# Patient Record
Sex: FEMALE | Race: WHITE | NOT HISPANIC OR LATINO | Employment: OTHER | URBAN - METROPOLITAN AREA
[De-identification: names, ages, dates, MRNs, and addresses within clinical notes are randomized per-mention and may not be internally consistent; named-entity substitution may affect disease eponyms.]

---

## 2018-01-02 ENCOUNTER — CONVERSION ENCOUNTER (OUTPATIENT)
Dept: OTHER | Facility: HOSPITAL | Age: 68
End: 2018-01-02

## 2018-02-20 ENCOUNTER — OFFICE VISIT CONVERTED (OUTPATIENT)
Dept: FAMILY MEDICINE CLINIC | Age: 68
End: 2018-02-20
Attending: FAMILY MEDICINE

## 2018-03-14 VITALS
SYSTOLIC BLOOD PRESSURE: 95 MMHG | HEART RATE: 66 BPM | DIASTOLIC BLOOD PRESSURE: 67 MMHG | DIASTOLIC BLOOD PRESSURE: 66 MMHG | DIASTOLIC BLOOD PRESSURE: 68 MMHG | SYSTOLIC BLOOD PRESSURE: 132 MMHG | DIASTOLIC BLOOD PRESSURE: 64 MMHG | SYSTOLIC BLOOD PRESSURE: 105 MMHG | HEART RATE: 62 BPM | WEIGHT: 155 LBS | HEART RATE: 69 BPM | DIASTOLIC BLOOD PRESSURE: 63 MMHG | DIASTOLIC BLOOD PRESSURE: 46 MMHG | SYSTOLIC BLOOD PRESSURE: 94 MMHG | RESPIRATION RATE: 17 BRPM | OXYGEN SATURATION: 94 % | TEMPERATURE: 97.6 F | SYSTOLIC BLOOD PRESSURE: 104 MMHG | RESPIRATION RATE: 21 BRPM | RESPIRATION RATE: 19 BRPM | TEMPERATURE: 98.9 F | OXYGEN SATURATION: 95 % | SYSTOLIC BLOOD PRESSURE: 81 MMHG | HEART RATE: 79 BPM | DIASTOLIC BLOOD PRESSURE: 56 MMHG | SYSTOLIC BLOOD PRESSURE: 78 MMHG | DIASTOLIC BLOOD PRESSURE: 84 MMHG | HEIGHT: 69 IN | SYSTOLIC BLOOD PRESSURE: 89 MMHG | OXYGEN SATURATION: 97 % | HEART RATE: 63 BPM | RESPIRATION RATE: 18 BRPM | DIASTOLIC BLOOD PRESSURE: 59 MMHG | SYSTOLIC BLOOD PRESSURE: 90 MMHG | HEART RATE: 71 BPM | RESPIRATION RATE: 16 BRPM | HEART RATE: 65 BPM | SYSTOLIC BLOOD PRESSURE: 117 MMHG | HEART RATE: 70 BPM | RESPIRATION RATE: 15 BRPM | SYSTOLIC BLOOD PRESSURE: 96 MMHG | OXYGEN SATURATION: 98 %

## 2018-03-15 ENCOUNTER — OFFICE (AMBULATORY)
Dept: URBAN - METROPOLITAN AREA PATHOLOGY 4 | Facility: PATHOLOGY | Age: 68
End: 2018-03-15

## 2018-03-15 ENCOUNTER — AMBULATORY SURGICAL CENTER (AMBULATORY)
Dept: URBAN - METROPOLITAN AREA SURGERY 17 | Facility: SURGERY | Age: 68
End: 2018-03-15
Payer: COMMERCIAL

## 2018-03-15 DIAGNOSIS — D12.5 BENIGN NEOPLASM OF SIGMOID COLON: ICD-10-CM

## 2018-03-15 DIAGNOSIS — Z12.11 ENCOUNTER FOR SCREENING FOR MALIGNANT NEOPLASM OF COLON: ICD-10-CM

## 2018-03-15 DIAGNOSIS — K57.30 DIVERTICULOSIS OF LARGE INTESTINE WITHOUT PERFORATION OR ABS: ICD-10-CM

## 2018-03-15 DIAGNOSIS — K63.5 POLYP OF COLON: ICD-10-CM

## 2018-03-15 DIAGNOSIS — D12.0 BENIGN NEOPLASM OF CECUM: ICD-10-CM

## 2018-03-15 DIAGNOSIS — D12.2 BENIGN NEOPLASM OF ASCENDING COLON: ICD-10-CM

## 2018-03-15 DIAGNOSIS — K64.9 UNSPECIFIED HEMORRHOIDS: ICD-10-CM

## 2018-03-15 LAB
GI HISTOLOGY: A. UNSPECIFIED: (no result)
GI HISTOLOGY: B. UNSPECIFIED: (no result)
GI HISTOLOGY: C. UNSPECIFIED: (no result)
GI HISTOLOGY: PDF REPORT: (no result)

## 2018-03-15 PROCEDURE — 45380 COLONOSCOPY AND BIOPSY: CPT | Mod: 33,59 | Performed by: INTERNAL MEDICINE

## 2018-03-15 PROCEDURE — 88305 TISSUE EXAM BY PATHOLOGIST: CPT | Performed by: INTERNAL MEDICINE

## 2018-03-15 PROCEDURE — 45380 COLONOSCOPY AND BIOPSY: CPT | Mod: 59,33 | Performed by: INTERNAL MEDICINE

## 2018-03-15 PROCEDURE — 45385 COLONOSCOPY W/LESION REMOVAL: CPT | Mod: 33 | Performed by: INTERNAL MEDICINE

## 2018-03-15 RX ADMIN — PROPOFOL 150 MG: 10 INJECTION, EMULSION INTRAVENOUS at 12:47

## 2018-03-15 RX ADMIN — PROPOFOL 50 MG: 10 INJECTION, EMULSION INTRAVENOUS at 12:50

## 2018-03-15 RX ADMIN — PROPOFOL 25 MG: 10 INJECTION, EMULSION INTRAVENOUS at 13:05

## 2018-03-15 RX ADMIN — PROPOFOL 25 MG: 10 INJECTION, EMULSION INTRAVENOUS at 12:53

## 2018-03-15 RX ADMIN — PROPOFOL 50 MG: 10 INJECTION, EMULSION INTRAVENOUS at 13:00

## 2018-03-15 RX ADMIN — LIDOCAINE HYDROCHLORIDE 25 MG: 10 INJECTION, SOLUTION EPIDURAL; INFILTRATION; INTRACAUDAL; PERINEURAL at 12:47

## 2018-03-15 NOTE — SERVICEHPINOTES
Patient is a 66 yo F with h/o CN 5 years ago, cardiac ablation 2005, father with colitis, no fam hx colon ca, on no RX medications, but is on multiple OTC medications.  Discussed risks, benefits of the procedure with the patient and she wishes to proceed.  All questions answered.

## 2018-10-31 ENCOUNTER — OFFICE VISIT CONVERTED (OUTPATIENT)
Dept: FAMILY MEDICINE CLINIC | Age: 68
End: 2018-10-31
Attending: NURSE PRACTITIONER

## 2019-12-31 ENCOUNTER — HOSPITAL ENCOUNTER (OUTPATIENT)
Dept: OTHER | Facility: HOSPITAL | Age: 69
Discharge: HOME | End: 2019-12-31
Attending: FAMILY MEDICINE

## 2019-12-31 ENCOUNTER — OFFICE VISIT CONVERTED (OUTPATIENT)
Dept: FAMILY MEDICINE CLINIC | Age: 69
End: 2019-12-31
Attending: FAMILY MEDICINE

## 2019-12-31 LAB
ALBUMIN SERPL-MCNC: 4.5 G/DL (ref 3.5–5)
ALBUMIN/GLOB SERPL: 1.6 {RATIO} (ref 1.4–2.6)
ALP SERPL-CCNC: 53 U/L (ref 43–160)
ALT SERPL-CCNC: 14 U/L (ref 10–40)
ANION GAP SERPL CALC-SCNC: 16 MMOL/L (ref 8–19)
AST SERPL-CCNC: 19 U/L (ref 15–50)
BILIRUB SERPL-MCNC: 0.27 MG/DL (ref 0.2–1.3)
BUN SERPL-MCNC: 14 MG/DL (ref 5–25)
BUN/CREAT SERPL: 16 {RATIO} (ref 6–20)
CALCIUM SERPL-MCNC: 9.5 MG/DL (ref 8.7–10.4)
CHLORIDE SERPL-SCNC: 98 MMOL/L (ref 99–111)
CHOLEST SERPL-MCNC: 211 MG/DL (ref 107–200)
CHOLEST/HDLC SERPL: 3.1 {RATIO} (ref 3–6)
CONV CO2: 26 MMOL/L (ref 22–32)
CONV TOTAL PROTEIN: 7.3 G/DL (ref 6.3–8.2)
CREAT UR-MCNC: 0.85 MG/DL (ref 0.5–0.9)
ERYTHROCYTE [DISTWIDTH] IN BLOOD BY AUTOMATED COUNT: 13.2 % (ref 11.5–14.5)
GFR SERPLBLD BASED ON 1.73 SQ M-ARVRAT: >60 ML/MIN/{1.73_M2}
GLOBULIN UR ELPH-MCNC: 2.8 G/DL (ref 2–3.5)
GLUCOSE SERPL-MCNC: 87 MG/DL (ref 65–99)
HBA1C MFR BLD: 11.8 G/DL (ref 12–16)
HCT VFR BLD AUTO: 36.6 % (ref 37–47)
HDLC SERPL-MCNC: 67 MG/DL (ref 40–60)
LDLC SERPL CALC-MCNC: 126 MG/DL (ref 70–100)
MCH RBC QN AUTO: 28.6 PG (ref 27–31)
MCHC RBC AUTO-ENTMCNC: 32.2 G/DL (ref 33–37)
MCV RBC AUTO: 88.6 FL (ref 81–99)
OSMOLALITY SERPL CALC.SUM OF ELEC: 282 MOSM/KG (ref 273–304)
PLATELET # BLD AUTO: 243 10*3/UL (ref 130–400)
PMV BLD AUTO: 8.9 FL (ref 7.4–10.4)
POTASSIUM SERPL-SCNC: 4 MMOL/L (ref 3.5–5.3)
RBC # BLD AUTO: 4.13 10*6/UL (ref 4.2–5.4)
SODIUM SERPL-SCNC: 136 MMOL/L (ref 135–147)
TRIGL SERPL-MCNC: 88 MG/DL (ref 40–150)
VLDLC SERPL-MCNC: 18 MG/DL (ref 5–37)
WBC # BLD AUTO: 5.45 10*3/UL (ref 4.8–10.8)

## 2020-01-02 LAB
IRON SATN MFR SERPL: 16 % (ref 20–55)
IRON SERPL-MCNC: 57 UG/DL (ref 60–170)
TIBC SERPL-MCNC: 360 UG/DL (ref 245–450)
TRANSFERRIN SERPL-MCNC: 252 MG/DL (ref 250–380)

## 2020-01-13 ENCOUNTER — OFFICE VISIT CONVERTED (OUTPATIENT)
Dept: FAMILY MEDICINE CLINIC | Age: 70
End: 2020-01-13
Attending: FAMILY MEDICINE

## 2020-01-15 ENCOUNTER — HOSPITAL ENCOUNTER (OUTPATIENT)
Dept: OTHER | Facility: HOSPITAL | Age: 70
Discharge: HOME | End: 2020-01-15
Attending: FAMILY MEDICINE

## 2020-03-05 ENCOUNTER — OFFICE (AMBULATORY)
Dept: URBAN - METROPOLITAN AREA CLINIC 75 | Facility: CLINIC | Age: 70
End: 2020-03-05

## 2020-03-05 VITALS
DIASTOLIC BLOOD PRESSURE: 88 MMHG | RESPIRATION RATE: 16 BRPM | SYSTOLIC BLOOD PRESSURE: 126 MMHG | HEIGHT: 69 IN | WEIGHT: 167 LBS

## 2020-03-05 DIAGNOSIS — K64.9 UNSPECIFIED HEMORRHOIDS: ICD-10-CM

## 2020-03-05 DIAGNOSIS — R07.89 OTHER CHEST PAIN: ICD-10-CM

## 2020-03-05 DIAGNOSIS — K57.30 DIVERTICULOSIS OF LARGE INTESTINE WITHOUT PERFORATION OR ABS: ICD-10-CM

## 2020-03-05 DIAGNOSIS — Z86.010 PERSONAL HISTORY OF COLONIC POLYPS: ICD-10-CM

## 2020-03-05 PROCEDURE — 99214 OFFICE O/P EST MOD 30 MIN: CPT | Performed by: NURSE PRACTITIONER

## 2020-03-10 VITALS
DIASTOLIC BLOOD PRESSURE: 79 MMHG | DIASTOLIC BLOOD PRESSURE: 80 MMHG | RESPIRATION RATE: 20 BRPM | RESPIRATION RATE: 18 BRPM | OXYGEN SATURATION: 97 % | TEMPERATURE: 98.2 F | HEART RATE: 70 BPM | TEMPERATURE: 97.8 F | SYSTOLIC BLOOD PRESSURE: 136 MMHG | OXYGEN SATURATION: 95 % | HEIGHT: 69 IN | SYSTOLIC BLOOD PRESSURE: 123 MMHG | SYSTOLIC BLOOD PRESSURE: 110 MMHG | HEART RATE: 64 BPM | OXYGEN SATURATION: 98 % | RESPIRATION RATE: 17 BRPM | SYSTOLIC BLOOD PRESSURE: 140 MMHG | WEIGHT: 155 LBS | SYSTOLIC BLOOD PRESSURE: 106 MMHG | HEART RATE: 74 BPM | OXYGEN SATURATION: 96 % | HEART RATE: 60 BPM | SYSTOLIC BLOOD PRESSURE: 111 MMHG | HEART RATE: 69 BPM | DIASTOLIC BLOOD PRESSURE: 82 MMHG | RESPIRATION RATE: 13 BRPM | DIASTOLIC BLOOD PRESSURE: 70 MMHG | DIASTOLIC BLOOD PRESSURE: 63 MMHG | HEART RATE: 73 BPM | DIASTOLIC BLOOD PRESSURE: 71 MMHG | RESPIRATION RATE: 12 BRPM

## 2020-03-12 ENCOUNTER — OFFICE (AMBULATORY)
Dept: URBAN - METROPOLITAN AREA PATHOLOGY 4 | Facility: PATHOLOGY | Age: 70
End: 2020-03-12

## 2020-03-12 ENCOUNTER — AMBULATORY SURGICAL CENTER (AMBULATORY)
Dept: URBAN - METROPOLITAN AREA SURGERY 17 | Facility: SURGERY | Age: 70
End: 2020-03-12

## 2020-03-12 DIAGNOSIS — K29.70 GASTRITIS, UNSPECIFIED, WITHOUT BLEEDING: ICD-10-CM

## 2020-03-12 DIAGNOSIS — R10.13 EPIGASTRIC PAIN: ICD-10-CM

## 2020-03-12 DIAGNOSIS — K29.60 OTHER GASTRITIS WITHOUT BLEEDING: ICD-10-CM

## 2020-03-12 DIAGNOSIS — R14.2 ERUCTATION: ICD-10-CM

## 2020-03-12 DIAGNOSIS — K21.0 GASTRO-ESOPHAGEAL REFLUX DISEASE WITH ESOPHAGITIS: ICD-10-CM

## 2020-03-12 DIAGNOSIS — K22.70 BARRETT'S ESOPHAGUS WITHOUT DYSPLASIA: ICD-10-CM

## 2020-03-12 LAB
GI HISTOLOGY: A. SELECT: (no result)
GI HISTOLOGY: B. UNSPECIFIED: (no result)
GI HISTOLOGY: PDF REPORT: (no result)

## 2020-03-12 PROCEDURE — 43239 EGD BIOPSY SINGLE/MULTIPLE: CPT | Performed by: INTERNAL MEDICINE

## 2020-03-12 PROCEDURE — 88305 TISSUE EXAM BY PATHOLOGIST: CPT | Performed by: INTERNAL MEDICINE

## 2020-11-13 ENCOUNTER — OFFICE VISIT CONVERTED (OUTPATIENT)
Dept: FAMILY MEDICINE CLINIC | Age: 70
End: 2020-11-13
Attending: FAMILY MEDICINE

## 2020-11-16 ENCOUNTER — HOSPITAL ENCOUNTER (OUTPATIENT)
Dept: OTHER | Facility: HOSPITAL | Age: 70
Discharge: HOME | End: 2020-11-16
Attending: FAMILY MEDICINE

## 2020-11-16 LAB
ALBUMIN SERPL-MCNC: 4.5 G/DL (ref 3.5–5)
ALBUMIN/GLOB SERPL: 1.6 {RATIO} (ref 1.4–2.6)
ALP SERPL-CCNC: 61 U/L (ref 43–160)
ALT SERPL-CCNC: 12 U/L (ref 10–40)
ANION GAP SERPL CALC-SCNC: 15 MMOL/L (ref 8–19)
AST SERPL-CCNC: 17 U/L (ref 15–50)
BILIRUB SERPL-MCNC: 0.35 MG/DL (ref 0.2–1.3)
BUN SERPL-MCNC: 11 MG/DL (ref 5–25)
BUN/CREAT SERPL: 14 {RATIO} (ref 6–20)
CALCIUM SERPL-MCNC: 10 MG/DL (ref 8.7–10.4)
CHLORIDE SERPL-SCNC: 101 MMOL/L (ref 99–111)
CONV CO2: 27 MMOL/L (ref 22–32)
CONV TOTAL PROTEIN: 7.4 G/DL (ref 6.3–8.2)
CREAT UR-MCNC: 0.81 MG/DL (ref 0.5–0.9)
ERYTHROCYTE [DISTWIDTH] IN BLOOD BY AUTOMATED COUNT: 13.6 % (ref 11.5–14.5)
GFR SERPLBLD BASED ON 1.73 SQ M-ARVRAT: >60 ML/MIN/{1.73_M2}
GLOBULIN UR ELPH-MCNC: 2.9 G/DL (ref 2–3.5)
GLUCOSE SERPL-MCNC: 84 MG/DL (ref 65–99)
HBA1C MFR BLD: 11.9 G/DL (ref 12–16)
HCT VFR BLD AUTO: 35.5 % (ref 37–47)
MCH RBC QN AUTO: 29.2 PG (ref 27–31)
MCHC RBC AUTO-ENTMCNC: 33.5 G/DL (ref 33–37)
MCV RBC AUTO: 87.2 FL (ref 81–99)
OSMOLALITY SERPL CALC.SUM OF ELEC: 287 MOSM/KG (ref 273–304)
PLATELET # BLD AUTO: 255 10*3/UL (ref 130–400)
PMV BLD AUTO: 8.7 FL (ref 7.4–10.4)
POTASSIUM SERPL-SCNC: 4.2 MMOL/L (ref 3.5–5.3)
RBC # BLD AUTO: 4.07 10*6/UL (ref 4.2–5.4)
SODIUM SERPL-SCNC: 139 MMOL/L (ref 135–147)
WBC # BLD AUTO: 6.22 10*3/UL (ref 4.8–10.8)

## 2020-11-17 LAB
FERRITIN SERPL-MCNC: 85 NG/ML (ref 10–200)
FOLATE SERPL-MCNC: 14.2 NG/ML (ref 4.8–20)
IRON SATN MFR SERPL: 13 % (ref 20–55)
IRON SERPL-MCNC: 46 UG/DL (ref 60–170)
TIBC SERPL-MCNC: 349 UG/DL (ref 245–450)
TRANSFERRIN SERPL-MCNC: 244 MG/DL (ref 250–380)

## 2021-04-22 VITALS
DIASTOLIC BLOOD PRESSURE: 49 MMHG | DIASTOLIC BLOOD PRESSURE: 61 MMHG | HEART RATE: 75 BPM | SYSTOLIC BLOOD PRESSURE: 89 MMHG | HEIGHT: 69 IN | HEART RATE: 81 BPM | HEART RATE: 83 BPM | SYSTOLIC BLOOD PRESSURE: 79 MMHG | DIASTOLIC BLOOD PRESSURE: 42 MMHG | SYSTOLIC BLOOD PRESSURE: 129 MMHG | SYSTOLIC BLOOD PRESSURE: 117 MMHG | DIASTOLIC BLOOD PRESSURE: 41 MMHG | HEART RATE: 76 BPM | SYSTOLIC BLOOD PRESSURE: 112 MMHG | RESPIRATION RATE: 28 BRPM | HEART RATE: 74 BPM | SYSTOLIC BLOOD PRESSURE: 84 MMHG | RESPIRATION RATE: 22 BRPM | DIASTOLIC BLOOD PRESSURE: 45 MMHG | SYSTOLIC BLOOD PRESSURE: 152 MMHG | RESPIRATION RATE: 24 BRPM | RESPIRATION RATE: 20 BRPM | WEIGHT: 155 LBS | RESPIRATION RATE: 17 BRPM | HEART RATE: 77 BPM | SYSTOLIC BLOOD PRESSURE: 142 MMHG | SYSTOLIC BLOOD PRESSURE: 88 MMHG | DIASTOLIC BLOOD PRESSURE: 78 MMHG | HEART RATE: 87 BPM | SYSTOLIC BLOOD PRESSURE: 92 MMHG | DIASTOLIC BLOOD PRESSURE: 43 MMHG | SYSTOLIC BLOOD PRESSURE: 82 MMHG | RESPIRATION RATE: 12 BRPM | RESPIRATION RATE: 19 BRPM | OXYGEN SATURATION: 97 % | DIASTOLIC BLOOD PRESSURE: 71 MMHG | SYSTOLIC BLOOD PRESSURE: 86 MMHG | OXYGEN SATURATION: 98 % | RESPIRATION RATE: 16 BRPM | TEMPERATURE: 98.2 F | DIASTOLIC BLOOD PRESSURE: 48 MMHG | RESPIRATION RATE: 7 BRPM | DIASTOLIC BLOOD PRESSURE: 90 MMHG | OXYGEN SATURATION: 99 % | RESPIRATION RATE: 18 BRPM | OXYGEN SATURATION: 100 % | RESPIRATION RATE: 21 BRPM | HEART RATE: 88 BPM | TEMPERATURE: 97 F

## 2021-04-27 ENCOUNTER — OFFICE VISIT CONVERTED (OUTPATIENT)
Dept: FAMILY MEDICINE CLINIC | Age: 71
End: 2021-04-27
Attending: NURSE PRACTITIONER

## 2021-04-28 ENCOUNTER — HOSPITAL ENCOUNTER (OUTPATIENT)
Dept: OTHER | Facility: HOSPITAL | Age: 71
Discharge: HOME | End: 2021-04-28
Attending: NURSE PRACTITIONER

## 2021-04-28 LAB
ALBUMIN SERPL-MCNC: 4.2 G/DL (ref 3.5–5)
ALBUMIN/GLOB SERPL: 1.6 {RATIO} (ref 1.4–2.6)
ALP SERPL-CCNC: 44 U/L (ref 43–160)
ALT SERPL-CCNC: 10 U/L (ref 10–40)
ANION GAP SERPL CALC-SCNC: 10 MMOL/L (ref 8–19)
AST SERPL-CCNC: 18 U/L (ref 15–50)
BASOPHILS # BLD MANUAL: 0.03 10*3/UL (ref 0–0.2)
BASOPHILS NFR BLD MANUAL: 0.9 % (ref 0–3)
BILIRUB SERPL-MCNC: 0.28 MG/DL (ref 0.2–1.3)
BUN SERPL-MCNC: 15 MG/DL (ref 5–25)
BUN/CREAT SERPL: 17 {RATIO} (ref 6–20)
CALCIUM SERPL-MCNC: 9.2 MG/DL (ref 8.7–10.4)
CHLORIDE SERPL-SCNC: 103 MMOL/L (ref 99–111)
CHOLEST SERPL-MCNC: 200 MG/DL (ref 107–200)
CHOLEST/HDLC SERPL: 2.5 {RATIO} (ref 3–6)
CONV CO2: 27 MMOL/L (ref 22–32)
CONV TOTAL PROTEIN: 6.8 G/DL (ref 6.3–8.2)
CREAT UR-MCNC: 0.9 MG/DL (ref 0.5–0.9)
DEPRECATED RDW RBC AUTO: 43.7 FL
EOSINOPHIL # BLD MANUAL: 0.1 10*3/UL (ref 0–0.7)
EOSINOPHIL NFR BLD MANUAL: 3 % (ref 0–7)
ERYTHROCYTE [DISTWIDTH] IN BLOOD BY AUTOMATED COUNT: 13.4 % (ref 11.5–14.5)
GFR SERPLBLD BASED ON 1.73 SQ M-ARVRAT: >60 ML/MIN/{1.73_M2}
GLOBULIN UR ELPH-MCNC: 2.6 G/DL (ref 2–3.5)
GLUCOSE SERPL-MCNC: 79 MG/DL (ref 65–99)
GRANS (ABSOLUTE): 1.62 10*3/UL (ref 2–8)
GRANS: 48.5 % (ref 30–85)
HBA1C MFR BLD: 10.4 G/DL (ref 12–16)
HCT VFR BLD AUTO: 32.1 % (ref 37–47)
HDLC SERPL-MCNC: 81 MG/DL (ref 40–60)
IMM GRANULOCYTES # BLD: 0 10*3/UL (ref 0–0.54)
IMM GRANULOCYTES NFR BLD: 0 % (ref 0–0.43)
LDLC SERPL CALC-MCNC: 111 MG/DL (ref 70–100)
LYMPHOCYTES # BLD MANUAL: 1.33 10*3/UL (ref 1–5)
LYMPHOCYTES NFR BLD MANUAL: 7.8 % (ref 3–10)
MCH RBC QN AUTO: 28.6 PG (ref 27–31)
MCHC RBC AUTO-ENTMCNC: 32.4 G/DL (ref 33–37)
MCV RBC AUTO: 88.2 FL (ref 81–99)
MONOCYTES # BLD AUTO: 0.26 10*3/UL (ref 0.2–1.2)
OSMOLALITY SERPL CALC.SUM OF ELEC: 282 MOSM/KG (ref 273–304)
PLATELET # BLD AUTO: 249 10*3/UL (ref 130–400)
PMV BLD AUTO: 9.7 FL (ref 7.4–10.4)
POTASSIUM SERPL-SCNC: 4.2 MMOL/L (ref 3.5–5.3)
RBC # BLD AUTO: 3.64 10*6/UL (ref 4.2–5.4)
SODIUM SERPL-SCNC: 136 MMOL/L (ref 135–147)
TRIGL SERPL-MCNC: 40 MG/DL (ref 40–150)
URATE SERPL-MCNC: 2.7 MG/DL (ref 2.5–7.5)
VARIANT LYMPHS NFR BLD MANUAL: 39.8 % (ref 20–45)
VLDLC SERPL-MCNC: 8 MG/DL (ref 5–37)
WBC # BLD AUTO: 3.34 10*3/UL (ref 4.8–10.8)

## 2021-04-29 ENCOUNTER — OFFICE (AMBULATORY)
Dept: URBAN - METROPOLITAN AREA PATHOLOGY 4 | Facility: PATHOLOGY | Age: 71
End: 2021-04-29
Payer: COMMERCIAL

## 2021-04-29 ENCOUNTER — AMBULATORY SURGICAL CENTER (AMBULATORY)
Dept: URBAN - METROPOLITAN AREA SURGERY 17 | Facility: SURGERY | Age: 71
End: 2021-04-29
Payer: COMMERCIAL

## 2021-04-29 DIAGNOSIS — Z86.010 PERSONAL HISTORY OF COLONIC POLYPS: ICD-10-CM

## 2021-04-29 DIAGNOSIS — K57.30 DIVERTICULOSIS OF LARGE INTESTINE WITHOUT PERFORATION OR ABS: ICD-10-CM

## 2021-04-29 DIAGNOSIS — K62.1 RECTAL POLYP: ICD-10-CM

## 2021-04-29 DIAGNOSIS — D12.0 BENIGN NEOPLASM OF CECUM: ICD-10-CM

## 2021-04-29 LAB
GI HISTOLOGY: A. UNSPECIFIED: (no result)
GI HISTOLOGY: B. UNSPECIFIED: (no result)
GI HISTOLOGY: PDF REPORT: (no result)
IRON SATN MFR SERPL: 13 % (ref 20–55)
IRON SERPL-MCNC: 45 UG/DL (ref 60–170)
TIBC SERPL-MCNC: 335 UG/DL (ref 245–450)
TRANSFERRIN SERPL-MCNC: 234 MG/DL (ref 250–380)

## 2021-04-29 PROCEDURE — 45380 COLONOSCOPY AND BIOPSY: CPT | Mod: 33,59 | Performed by: INTERNAL MEDICINE

## 2021-04-29 PROCEDURE — 45385 COLONOSCOPY W/LESION REMOVAL: CPT | Mod: 33 | Performed by: INTERNAL MEDICINE

## 2021-04-29 PROCEDURE — 88305 TISSUE EXAM BY PATHOLOGIST: CPT | Mod: 33 | Performed by: INTERNAL MEDICINE

## 2021-04-29 NOTE — SERVICEHPINOTES
ALIREZA MEANS  is a  70  female   who presents today for a  Colonoscopy   for   the indications listed below. The updated Patient Profile was reviewed prior to the procedure, in conjunction with the Physical Exam, including medical conditions, surgical procedures, medications, allergies, family history and social history. See Physical Exam time stamp below for date and time of HPI completion.Pre-operatively, I reviewed the indication(s) for the procedure, the risks of the procedure [including but not limited to: unexpected bleeding possibly requiring hospitalization and/or unplanned repeat procedures, perforation possibly requiring surgical treatment, missed lesions and complications of sedation/MAC (also explained by anesthesia staff)]. I have evaluated the patient for risks associated with the planned anesthesia and the procedure to be performed and find the patient an acceptable candidate for IV sedation.Multiple opportunities were provided for any questions or concerns, and all questions were answered satisfactorily before any anesthesia was administered. We will proceed with the planned procedure.BR

## 2021-05-18 NOTE — PROGRESS NOTES
Hortencia Parisi 1950     Office/Outpatient Visit    Visit Date: Wed, Oct 31, 2018 09:26 am    Provider: Emily Padilla N.P. (Assistant: Sofie Anderson MA)    Location: [facility]        Electronically signed by Emily Padilla N.P. on  10/31/2018 05:16:50 PM                             SUBJECTIVE:        CC:     Ms. Parisi is a 68 year old White female.  She presents with Pt fell on Sunday, Left ankle swollen & painful.  (DISCUSS PNEUMONIA VACCINES)         HPI:         Patient to be evaluated for ankle pain.  The pain is primarily in the left ankle.  The level of pain between 1 and 10 is a 2.  Swelling is noted to be mild.  The pain does not radiate.  It began 3 days ago.  The precipitating event appears to have been twisted ankle walking down basement stairs at home..  Other details: using crutches, wearing compression sock and applying ice.  has not felt need for tylenol..      ROS:     CONSTITUTIONAL:  Negative for chills, fatigue, fever, and weight change.      CARDIOVASCULAR:  Negative for chest pain, palpitations, tachycardia, orthopnea, and edema.      RESPIRATORY:  Negative for cough, dyspnea, and hemoptysis.      MUSCULOSKELETAL:  Positive for left ankle pain.      NEUROLOGICAL:  Negative for dizziness, headaches, paresthesias, and weakness.          PM/FM/SH:     Last Reviewed on 10/31/2018 09:58 AM by Emily Padilla    Past Medical History:             PREVENTIVE HEALTH MAINTENANCE             BREAST EXAM: with normal results     BONE DENSITY: has never been done     COLORECTAL CANCER SCREENING: Up to date (colonoscopy q10y; sigmoidoscopy q5y; Cologuard q3y) was last done march 2018, Results are in chart; colonoscopy with the following abnormalities noted-- diverticulitis, Polyp(s), and adenomatous-  follow up 2021     MAMMOGRAM: Done within last 2 years and results in are chart was last done jan 2018 with normal results     PAP SMEAR: was last done dec 2017 with  normal results         Surgical History:     Procedures: colonoscopy 07         Family History:     Father:  at age 79;  Aortic Aneurysm; Arrhythmia ( A-fib );  Renal Carcinoma;  COPD     Mother:  at age 75;  Diverticulitis -  of ruptured bowel;  osteoprosis     Brother(s): Healthy; 1 brother(s) total     Sister(s): Healthy; 1 sister(s) total         Tobacco/Alcohol/Supplements:     Last Reviewed on 10/31/2018 09:32 AM by Sofie Anderson    Tobacco: She has never smoked.          Substance Abuse History:     Last Reviewed on 2017 12:23 PM by Jarrett Marrero        Mental Health History:     Last Reviewed on 2017 12:23 PM by Jarrett Marrero        Communicable Diseases (eg STDs):     Last Reviewed on 2017 12:23 PM by Jarrett Marrero            Current Problems:     Last Reviewed on 2017 12:23 PM by Jarrett Marrero    Irritated seborrheic keratosis     Hemangioma, other sites     Seborrheic keratosis, other     Actinic keratosis     Sinus headaches         Immunizations:     zzPrevnar-13 2016     PNEUMOVAX 23 (Pneumococcal PPV23) 2017     Adacel (Tdap) 11/3/2017         Allergies:     Last Reviewed on 2018 11:36 AM by Alma Poon    Prednisone:        Current Medications:     Last Reviewed on 10/31/2018 09:30 AM by Sofie Anderson    Dimenhydrinate 50mg Tablet Take 1 tablet(s) by mouth q 4 to 6 hr prn     Calcium 600 Tablet 1 tab daily     Multivitamins Tablet 1 tab daily     Loratadine 10mg Tablet 1 tab daily     flonase     Juice plus q day         OBJECTIVE:        Vitals:         Historical:     2018  BP:   117/77 mm Hg ( (left arm, , sitting, );)     2018  Wt:   156.8lbs        Current: 10/31/2018 9:33:26 AM    Ht:  5 ft, 8 in;  Wt: 156 lbs (Estimated);  BMI: 23.7    T: 97.4 F (oral);  BP: 122/81 mm Hg (left arm, sitting);  P: 76 bpm (left arm (BP Cuff), sitting)        Exams:     PHYSICAL EXAM:     GENERAL:  well  developed and nourished; appropriately groomed; in no apparent distress;     RESPIRATORY: normal respiratory rate and pattern with no distress; normal breath sounds with no rales, rhonchi, wheezes or rubs;     CARDIOVASCULAR: normal rate; rhythm is regular;     Peripheral Pulses: dorsalis pedis: 2+ L;  posterior tibial: 2+ L;  mild swelling left lateral ankle edema;     MUSCULOSKELETAL: decreased range of motion noted in: left ankle flexion and extension;  pain with range of motion in: left ankle flexion, extension, and inversion;     NEUROLOGIC: mental status: alert and oriented x 3; GROSSLY INTACT     PSYCHIATRIC:  appropriate affect and demeanor; normal speech pattern; grossly normal memory;         ASSESSMENT           719.47   M25.572  Ankle pain              DDx:         ORDERS:         Radiology/Test Orders:       [id number]  Radiologic examination, left ankle; complete minimum 3 views  (Send-Out)                   PLAN:          Ankle pain         RADIOLOGY:  I have ordered a left ankle xray to be done today.      RECOMMENDATIONS given include: RICE therapy and xray pending..            Orders:       [id number]  Radiologic examination, left ankle; complete minimum 3 views  (Send-Out)               Patient Recommendations:        For  Ankle pain:     left ankle x-ray Rest the foot and keep it elevated as much as possible. Apply ice over the affected area. Use a compression ankle wrap, such as an Ace bandage.              CHARGE CAPTURE           **Please note: ICD descriptions below are intended for billing purposes only and may not represent clinical diagnoses**        Primary Diagnosis:         719.47 Ankle pain            M25.572    Pain in left ankle and joints of left foot              Orders:          [id number]   Office/outpatient visit; established patient, level 3  (In-House)

## 2021-05-18 NOTE — PROGRESS NOTES
Hortencia Parisi  1950     Office/Outpatient Visit    Visit Date: Mon, Jan 13, 2020 10:11 am    Provider: Scott Adair MD (Assistant: Iris John MA)    Location: [facility]        Electronically signed by Scott Adair MD on  01/13/2020 11:29:22 PM                             Subjective:        CC: chest pain    HPI:       Kandice is in today for follow up on chest pain.  She says that this has been present all weekend.  It started Saturday after aerobics.  It let up Saturday evening - meaning that it went away completely.  She says that she had symptoms yesterday and then again today.  She says that this reminds her of what she experiences when she had pneumonia.  She has noted that her heart rate has been elevated as well on Saturday, but that was after aerobics.  She did have some shortness of breath with aerobics on Saturday.  Her heart rate was 158 at that time.  She says that this is not pain.  She says that there is the feeling of pressure.  She is not willing to quantify this as pain.  She has never smoked.  She does not take cholesterol or blood pressure medication.  She does take iron for some anemia.    ROS:     CONSTITUTIONAL:  Negative for chills and fever.      CARDIOVASCULAR:  Negative for chest pain and palpitations.      RESPIRATORY:  Negative for recent cough.      GASTROINTESTINAL:  Negative for abdominal pain, nausea and vomiting.      INTEGUMENTARY/BREAST:  Negative for atypical mole(s) and rash.          Past Medical History / Family History / Social History:         Last Reviewed on 1/13/2020 10:23 AM by Scott Adair    Past Medical History:             PREVENTIVE HEALTH MAINTENANCE             BREAST EXAM: with normal results     BONE DENSITY: has never been done     COLORECTAL CANCER SCREENING: Up to date (colonoscopy q10y; sigmoidoscopy q5y; Cologuard q3y) was last done march 2018, Results are in chart; colonoscopy with the following  abnormalities noted-- diverticulitis, Polyp(s), and adenomatous-  follow up      MAMMOGRAM: Done within last 2 years and results in are chart was last done 2018 with normal results     PAP SMEAR: was last done dec 2017 with normal results         Surgical History:     Procedures: colonoscopy 07         Family History:     Father:  at age 79;  Aortic Aneurysm; Arrhythmia ( A-fib );  Renal Carcinoma;  COPD     Mother:  at age 75;  Diverticulitis -  of ruptured bowel;  osteoprosis     Brother(s): Healthy; 1 brother(s) total     Sister(s): Healthy; 1 sister(s) total         Tobacco/Alcohol/Supplements:     Last Reviewed on 2020 10:23 AM by Scott Adair    Tobacco: She has never smoked.          Substance Abuse History:     Last Reviewed on 2020 10:23 AM by Scott Adair        Mental Health History:     Last Reviewed on 2020 10:23 AM by Scott Adair        Communicable Diseases (eg STDs):     Last Reviewed on 2020 10:23 AM by Scott Adair        Current Problems:     Last Reviewed on 2020 10:23 AM by Scott Adair    Actinic keratosis    Other seborrheic keratosis    Hemangioma of other sites    Inflamed seborrheic keratosis    Headache    Encounter for screening for cardiovascular disorders    Other chest pain    Anemia, unspecified        Immunizations:     zzPrevnar-13 2016    PNEUMOVAX 23 (Pneumococcal PPV23) 2017    Adacel (Tdap) 11/3/2017        Allergies:     Last Reviewed on 2020 10:23 AM by Scott Adair    Prednisone:          Current Medications:     Last Reviewed on 2020 10:23 AM by Scott Adair    Loratadine 10 mg oral tablet [1 tab daily]    Dimenhydrinate 50 mg oral tablet [Take 1 tablet(s) by mouth q 4 to 6 hr prn]    Calcium 600 600 mg calcium (1,500 mg) oral tablet [1 tab daily]    multivitamin oral tablet [1 tab daily]    Juice plus q day     flonase     ferrous  sulfate 325 mg (65 mg iron) oral tablet [take 1 tablet (325 mg) by oral route once daily, take with Orange Juice or Vit C tablet to improve absorption]    ZyrTEC 10 mg oral tablet [take 1 tablet (10 mg) by oral route once daily]        Objective:        Vitals:         Current: 1/13/2020 10:16:04 AM    Ht:  5 ft, 8 in;  Wt: 161 lbs;  BMI: 24.5T: 97.9 F (oral);  BP: 120/72 mm Hg (right arm, sitting);  P: 68 bpm (right arm (BP Cuff), sitting);  sCr: 0.85 mg/dL;  GFR: 68.90        Exams:     PHYSICAL EXAM:     GENERAL: vital signs recorded - well developed, well nourished;  no apparent distress;     NECK: range of motion is normal; thyroid is non-palpable;     RESPIRATORY: normal respiratory rate and pattern with no distress; normal breath sounds with no rales, rhonchi, wheezes or rubs;     CARDIOVASCULAR: normal rate; rhythm is regular;  no systolic murmur; no edema;     GASTROINTESTINAL: nontender; normal bowel sounds; no organomegaly;     LYMPHATIC: no enlargement of cervical or facial nodes; no supraclavicular nodes;     BREAST/INTEGUMENT: SKIN: no significant rashes or lesions; no suspicious moles;     PSYCHIATRIC: affect/demeanor: anxious;  normal psychomotor function;         Lab/Test Results:         LABORATORY RESULTS: EKG performed by tls         Assessment:         R07.89   Other chest pain           ORDERS:         Radiology/Test Orders:       [id number]  Electrocardiogram, routine with at least 12 leads; with interpretation and report  (In-House)            3017F  Colorectal CA screen results documented and reviewed (PV)  (In-House)              Other Orders:         Depression screen negative  (In-House)                      Plan:         Other chest pain        TESTS/PROCEDURES:  Will proceed with an ECG to be performed/scheduled now.      RECOMMENDATIONS given include: Today, we have reviewed her care.  Her EKG is completely normal.  After talking with her further, we will have her go to the ER as a  precaution for initial testing.  She at least needs cardiac enzymes, X-ray, and possibly D-dimer testing done.  It is possible there is some GI or lung issue causing her symptoms, but the description of them developing after exercise concerns me.  I will call the ER and make them aware that she is coming..  MIPS PHQ-9 Depression Screening: Completed form scanned and in chart; Total Score 0; Negative Depression Screen           Orders:       [id number]  Electrocardiogram, routine with at least 12 leads; with interpretation and report  (In-House)              Depression screen negative  (In-House)            3017F  Colorectal CA screen results documented and reviewed (PV)  (In-House)                  Charge Capture:         Primary Diagnosis:     R07.89  Other chest pain           Orders:      [id number]  Office/outpatient visit; established patient, level 4  (In-House)            [id number]  Electrocardiogram, routine with at least 12 leads; with interpretation and report  (In-House)              Depression screen negative  (In-House)            3017F  Colorectal CA screen results documented and reviewed (PV)  (In-House)

## 2021-05-18 NOTE — PROGRESS NOTES
Hortencia Parisi  1950     Office/Outpatient Visit    Visit Date: Tue, Apr 27, 2021 02:40 pm    Provider: Fabiola Alcantar N.P. (Assistant: Jessica Zavala MA)    Location: [facility]        Electronically signed by Fabiola Alcantar N.P. on  04/27/2021 03:55:21 PM                             Subjective:        CC: Mrs. Parisi is a 70 year old White female.  Patient presents today with complaints of R hand swelling and pain X 4 days;         HPI: 71 y/o female presenting to office c/o right hand edema and pain x 4 days. No known injury. She has tried at home for symptoms. She went to Urgent care earlier today and xray was negative. She had been working outside in the garden prior to pain onset.  Erythema and edema worsening.        Pt is also requesting routine blood work and preventative imaging.  Her last mammogram was in 2018. She has not had DEXA scan in the past. Pap no longer indicated. Colonoscopy scheduled for this thursday. Pt declines flu and shingles vaccine. Pt has no other acute complaints at this time.    ROS:     CONSTITUTIONAL:  Negative for fatigue, fever and night sweats.      EYES:  Negative for blurred vision.      E/N/T:  Negative for diminished hearing.      CARDIOVASCULAR:  Negative for chest pain.      RESPIRATORY:  Negative for dyspnea.      GASTROINTESTINAL:  Negative for abdominal pain, diarrhea, nausea and vomiting.      GENITOURINARY:  Negative for dysuria.      MUSCULOSKELETAL:  See HPI     INTEGUMENTARY/BREAST:  Negative for rash.      NEUROLOGICAL:  Negative for dizziness, paresthesias and weakness.      PSYCHIATRIC:  Negative for anxiety, depression and suicidal thoughts.          Past Medical History / Family History / Social History:         Last Reviewed on 4/27/2021 03:43 PM by Fabiola Alcantar    Past Medical History:             PREVENTIVE HEALTH MAINTENANCE             BREAST EXAM: with normal results     BONE DENSITY: has never been done      COLORECTAL CANCER SCREENING: Up to date (colonoscopy q10y; sigmoidoscopy q5y; Cologuard q3y) was last done 2018, Results are in chart; colonoscopy with the following abnormalities noted-- diverticulitis, Polyp(s), and adenomatous-  follow up  scheduled 21     MAMMOGRAM: Done within last 2 years and results in are chart was last done 2018 with normal results     PAP SMEAR: was last done dec 2017 with normal results             PAST MEDICAL HISTORY         Villarreal's Esophagus         Surgical History:     Procedures: colonoscopy 07         Family History:     Father:  at age 79;  Aortic Aneurysm; Arrhythmia ( A-fib );  Renal Carcinoma;  COPD     Mother:  at age 75;  Diverticulitis -  of ruptured bowel;  osteoprosis     Brother(s): Healthy; 1 brother(s) total     Sister(s): Healthy; 1 sister(s) total         Tobacco/Alcohol/Supplements:     Last Reviewed on 2021 03:43 PM by Fabiola Alcantar    Tobacco: She has never smoked.          Substance Abuse History:     Last Reviewed on 2021 03:43 PM by Fabiola Alcantar    None         Mental Health History:     Last Reviewed on 2021 03:43 PM by Fabiola Alcantar    NEGATIVE         Communicable Diseases (eg STDs):     Last Reviewed on 2021 03:43 PM by Fabiola Alcantar        Immunizations:     zzPrevnar-13 2016    PNEUMOVAX 23 (Pneumococcal PPV23) 2017    Adacel (Tdap) 11/3/2017        Allergies:     Last Reviewed on 2021 03:43 PM by Fabiola Alcantar    Prednisone:          Current Medications:     Last Reviewed on 2021 03:43 PM by Fabiola Alcantar    ZyrTEC 10 mg oral tablet [take 1 tablet (10 mg) by oral route once daily]    Dimenhydrinate 50 mg oral tablet [Take 1 tablet(s) by mouth q 4 to 6 hr prn]    Calcium 600 600 mg calcium (1,500 mg) oral tablet [1 tab daily]    flonase     Protonix 40 mg oral tablet, delayed release (enteric coated) [take 1 tablet (40 mg) by oral route once daily]     Singulair 10 mg oral tablet [Take 1 tablet(s) by mouth each evening]        Objective:        Vitals:         Current: 4/27/2021 2:44:22 PM    Ht:  5 ft, 8 in;  Wt: 157.4 lbs;  BMI: 23.9T: 97.9 F (temporal);  BP: 121/66 mm Hg (right arm, sitting);  P: 87 bpm (right arm (BP Cuff), sitting);  sCr: 0.81 mg/dL;  GFR: 70.67        Exams:     PHYSICAL EXAM:     GENERAL: vital signs recorded - well developed, well nourished;  well groomed;  no apparent distress;     EYES: extraocular movements intact; conjunctiva and cornea are normal; PERRLA;     NECK: range of motion is normal; thyroid is non-palpable;     RESPIRATORY: normal respiratory rate and pattern with no distress; normal breath sounds with no rales, rhonchi, wheezes or rubs;     CARDIOVASCULAR: normal rate; rhythm is regular;  no systolic murmur; no edema;     GASTROINTESTINAL: nontender, nondistended; no hepatosplenomegaly or masses; no bruits;     BREAST/INTEGUMENT: no rash/jaundice;     MUSCULOSKELETAL: normal gait; normal overall tone erythema, edema and warmth noted to right superior hand.;     NEUROLOGIC: mental status: alert and oriented x 3;     PSYCHIATRIC:  appropriate affect and demeanor; normal speech pattern; grossly normal memory;         Assessment:         M79.641   Pain in right hand       Z00.01   Encounter for general adult medical examination with abnormal findings           ORDERS:         Meds Prescribed:       [Refilled] cephALEXin 500 mg oral capsule [take 1 capsule (500 mg) by oral route 3 times per day], #30 (thirty) capsules, Refills: 0 (zero)         Radiology/Test Orders:       [id number]  DXA, bone density study, 1 or more sites; axial skeleton (eg hips, pelvis, spine)  (Send-Out)            [id number]  Screening mammography bi 2-view inc CAD  (Send-Out)              Lab Orders:       [id number]  BDCBC - [facility] CBC with 3 part diff  (Send-Out)            [id number]  COMP - [facility] Comp. Metabolic Panel  (Send-Out)            51001  LPDP - [facility] Lipid Panel   (Send-Out)            05248  URIC [facility] Uric Acid, Serum  (Send-Out)                      Plan:         Pain in right handAppears to be cellulitis. less likely, gout. Since getting routine labs, will r/o. luke warm espom salt soaks in case there is a thorn that can be pulled out. Otherwise use ice therapy - 20 minutes at a time - 4 x daily. Complete rx abx. Monitor for worsening signs of infection. Pt v/u and had no further questions upon d/c.     LABORATORY:  Labs ordered to be performed today include uric acid.            Prescriptions:       [Refilled] cephALEXin 500 mg oral capsule [take 1 capsule (500 mg) by oral route 3 times per day], #30 (thirty) capsules, Refills: 0 (zero)           Orders:       [id number]  URIC - [facility] Uric Acid, Serum  (Send-Out)              Encounter for general adult medical examination with abnormal findingsDEXA and mammo request sent to referrals. Will notify pt of results. Pt declined flu and shingles vaccine, but is otherwise UTD with preventative care.     LABORATORY:  Labs ordered to be performed today include CBC, Comprehensive metabolic panel, and lipid panel.      RADIOLOGY:  I have ordered Dexa Scan and Mammogram Screening to be done today.            Orders:       [id number]  [facility] CBC with 3 part diff  (Send-Out)            40896  COMP [facility] Comp. Metabolic Panel  (Send-Out)            98039  DP [facility] Lipid Panel  (Send-Out)            34128  DXA, bone density study, 1 or more sites; axial skeleton (eg hips, pelvis, spine)  (Send-Out)            04317  Screening mammography bi 2-view inc CAD  (Send-Out)                  Charge Capture:         Primary Diagnosis:     M79.641  Pain in right hand           Orders:      [id number]  Office/outpatient visit; established patient, level 3  (In-House)              Z00.01  Encounter for general adult medical examination with abnormal findings           Orders:      [id number]  Preventive medicine, established patient, age 65+ years  (In-House)

## 2021-05-18 NOTE — PROGRESS NOTES
Hortencia Parisi 1950     Office/Outpatient Visit    Visit Date: Tue, Feb 20, 2018 11:34 am    Provider: Jarrett Marrero MD (Assistant: Alma Poon MA)    Location: [facility]        Electronically signed by Jarrett Marrero MD on  02/25/2018 12:01:23 PM                             SUBJECTIVE:        CC:     Ms. Parisi is a 67 year old White female.  This is a follow-up visit.  mole removal, med refill;         HPI:     She is  asking for refill of her dramamine which she takes for motion sickness, but also takes occasionally if she get neck spasm and seems to be quite  helpful.     She has several skin tags on her neck that rub up against necklaces and collars so cause her discomfort/irritation.     She also has a skin lesion at the bra line that rubs and gets irritated.  Exam shows SK     ROS:     CONSTITUTIONAL:  Negative for chills, fatigue, fever, and weight change.      EYES:  Negative for blurred vision.      CARDIOVASCULAR:  Negative for chest pain, orthopnea, paroxysmal nocturnal dyspnea and pedal edema.      RESPIRATORY:  Negative for dyspnea.      GASTROINTESTINAL:  Negative for abdominal pain, constipation, diarrhea, nausea and vomiting.      GENITOURINARY:  Negative for dysuria and frequent urination.      NEUROLOGICAL:  Negative for dizziness, headaches, paresthesias, and weakness.      PSYCHIATRIC:  Negative for anxiety, depression, and sleep disturbances.          PMH/FMH/SH:     Last Reviewed on 11/03/2017 12:23 PM by Jarrett Marrero    Past Medical History:             PREVENTIVE HEALTH MAINTENANCE             BREAST EXAM: with normal results     BONE DENSITY: has never been done     COLORECTAL CANCER SCREENING:; colonoscopy with the following abnormalities noted-- diverticulitis     MAMMOGRAM: with normal results     PAP SMEAR: was last done dec 2017 with normal results         Surgical History:     Procedures: colonoscopy 6/1/07         Family History:      Father:  at age 79;  Aortic Aneurysm; Arrhythmia ( A-fib );  Renal Carcinoma;  COPD     Mother:  at age 75;  Diverticulitis -  of ruptured bowel;  osteoprosis     Brother(s): Healthy; 1 brother(s) total     Sister(s): Healthy; 1 sister(s) total         Tobacco/Alcohol/Supplements:     Last Reviewed on 2017 11:31 AM by Sofie Anderson    Tobacco: She has never smoked.          Substance Abuse History:     Last Reviewed on 2017 12:23 PM by Jarrett Marrero        Mental Health History:     Last Reviewed on 2017 12:23 PM by Jarrett Marrero        Communicable Diseases (eg STDs):     Last Reviewed on 2017 12:23 PM by Jarrett Marrero            Allergies:     Last Reviewed on 2017 11:31 AM by Sofie Anderson    Prednisone:        Current Medications:     Last Reviewed on 2017 11:33 AM by Sofie Anderson    Dimenhydrinate 50mg Tablet Take 1 tablet(s) by mouth q 4 to 6 hr prn     Fluticasone Propionate 50mcg/1actuation Nasal Spray 1 spray in each nostril daily  prn     Calcium 600 Tablet 1 tab daily     Multivitamins Tablet 1 tab daily     Loratadine 10mg Tablet 1 tab daily         OBJECTIVE:        Vitals:         Current: 2018 11:35:55 AM    Ht:  5 ft, 8 in;  Wt: 156.8 lbs;  BMI: 23.8    T: 98.1 F (oral);  BP: 117/77 mm Hg (left arm, sitting);  P: 70 bpm (left arm (BP Cuff), sitting)        Exams:     PHYSICAL EXAM:     GENERAL: well developed, well nourished;  well groomed;  no apparent distress;     EYES: nonicteric;     NECK:  supple, full ROM; no thyromegaly; no carotid bruits;     RESPIRATORY: normal appearance and symmetric expansion of chest wall; normal respiratory rate and pattern with no distress; normal breath sounds with no rales, rhonchi, wheezes or rubs;     CARDIOVASCULAR: normal rate; rhythm is regular;  no systolic murmur;     BREAST/INTEGUMENT: several skin tags on her neck at the collar line.; most are on the left side of neck.  Also has 1.5 cm SK on back left of center at the bra line.     MUSCULOSKELETAL: no pedal edema;     NEUROLOGIC: no focal lateralizing deficits.;     PSYCHIATRIC:  appropriate affect and demeanor; normal speech pattern; grossly normal memory;         Procedures:     Skin tag         Procedure Note:     Informed consent obtained in writing.  She expresses understanding that a scar may remain after the lesion is removed.  Sterile technique is observed.  Multiple skin tags are located around the neck.   Anesthesia was obtained with 2.5 cc of 1% lidocaine with epinephrine.   The method of removal is shave excision.   Hemostasis is achieved with silver nitrate.          Irritated seborrheic keratosis         Procedure Note:         Benign appearing lesion #1 is a seborrheic keratosis located on back at bra line.   The method of lesion destruction is cryotherapy destruction.  Freeze-thaw-freeze             ASSESSMENT           994.6   T75.3XXA  Motion sickness              DDx:     701.9   L91.8  Skin tag              DDx:     702.11   L82.0  Irritated seborrheic keratosis              DDx:     782.0   R20.8  Disturbance of skin sensation              DDx:         ORDERS:         Meds Prescribed:       Refill of: Dimenhydrinate (Dimenhydrinate)  50mg Tablet Take 1 tablet(s) by mouth q 4 to 6 hr prn  #30 (Thirty) tablet(s) Refills: 2         Procedures Ordered:       80671-22  Removal of skin tags, multiple fibrocutaneous tags, any area; (initial 1-15)  (In-House)         34868  Destruction, benign or premalignant lesions; first lesion  (In-House)                   PLAN:          Motion sickness           Prescriptions:       Refill of: Dimenhydrinate (Dimenhydrinate)  50mg Tablet Take 1 tablet(s) by mouth q 4 to 6 hr prn  #30 (Thirty) tablet(s) Refills: 2          Skin tag           Orders:       80079-02  Removal of skin tags, multiple fibrocutaneous tags, any area; (initial 1-15)  (In-House)            Irritated  seborrheic keratosis           Orders:       02002  Destruction, benign or premalignant lesions; first lesion  (In-House)               CHARGE CAPTURE           **Please note: ICD descriptions below are intended for billing purposes only and may not represent clinical diagnoses**        Primary Diagnosis:         994.6 Motion sickness            T75.3XXA    Motion sickness, initial encounter              Orders:          77582 -25  Office/outpatient visit; established patient, level 2  (In-House)           701.9 Skin tag            L91.8    Other hypertrophic disorders of the skin              Orders:          62434 -51  Removal of skin tags, multiple fibrocutaneous tags, any area; (initial 1-15)  (In-House)           702.11 Irritated seborrheic keratosis            L82.0    Inflamed seborrheic keratosis              Orders:          23572   Destruction, benign or premalignant lesions; first lesion  (In-House)           782.0 Disturbance of skin sensation            R20.8    Other disturbances of skin sensation

## 2021-05-18 NOTE — PROGRESS NOTES
Hortencia Parisi  1950     Office/Outpatient Visit    Visit Date: Fri, Nov 13, 2020 08:51 am    Provider: Scott Adair MD (Assistant: Piedad Khalil LPN)    Location: [facility]        Electronically signed by Scott Adair MD on  11/14/2020 08:02:19 AM                             Subjective:        CC: Villarreal's esophagus        TELEMEDICINE VISIT:    - Kandice consented to this telemedicine visit.    - Persons present during the telemedicine consultation include:  Kandice - patient, Dr. Adair    - This visit is being conducted over FaceTime with audio and video.    HPI:       Kandice is being seen today for follow up on GERD and Villarreal's esophagus.  She is currently taking Protonix for this and tolerates it well.  She denies acute issue in this regard.  She will be planning to have EGD on a regular basis.        Kandice also has history of sinus pressure and pain that continues to bother her.  She has previously taken Zyrtec and loratadine, but she still having frequent headache that she believes is sinus related.  She denies fever or chills.  She denies congestion or drainage.  It is really the headache that bothers her.          As noted above, she has noted this ongoing headache.  It does seem frontal in nature.  She is wondering as above if there could be a sinus infection.  The headache has been more prevalent over the last 6 weeks.    ROS:     CONSTITUTIONAL:  Negative for chills and fever.      CARDIOVASCULAR:  Negative for chest pain and palpitations.      RESPIRATORY:  Negative for recent cough and dyspnea.      GASTROINTESTINAL:  Negative for abdominal pain, nausea and vomiting.      INTEGUMENTARY/BREAST:  Negative for atypical mole(s) and rash.          Past Medical History / Family History / Social History:         Last Reviewed on 11/13/2020 09:18 AM by Scott Adair    Past Medical History:             PREVENTIVE HEALTH MAINTENANCE             BREAST EXAM: with  normal results     BONE DENSITY: has never been done     COLORECTAL CANCER SCREENING: Up to date (colonoscopy q10y; sigmoidoscopy q5y; Cologuard q3y) was last done 2018, Results are in chart; colonoscopy with the following abnormalities noted-- diverticulitis, Polyp(s), and adenomatous-  follow up      MAMMOGRAM: Done within last 2 years and results in are chart was last done 2018 with normal results     PAP SMEAR: was last done dec 2017 with normal results             PAST MEDICAL HISTORY         Villarreal's Esophagus         Surgical History:     Procedures: colonoscopy 07         Family History:     Father:  at age 79;  Aortic Aneurysm; Arrhythmia ( A-fib );  Renal Carcinoma;  COPD     Mother:  at age 75;  Diverticulitis -  of ruptured bowel;  osteoprosis     Brother(s): Healthy; 1 brother(s) total     Sister(s): Healthy; 1 sister(s) total         Tobacco/Alcohol/Supplements:     Last Reviewed on 2020 09:18 AM by Scott Adair    Tobacco: She has never smoked.          Substance Abuse History:     Last Reviewed on 2020 09:18 AM by Scott Adair        Mental Health History:     Last Reviewed on 2020 09:18 AM by Scott Adair        Communicable Diseases (eg STDs):     Last Reviewed on 2020 09:18 AM by Scott Adair        Current Problems:     Last Reviewed on 2020 09:18 AM by Scott Adair    Actinic keratosis    Hemangioma of other sites    Other seborrheic keratosis    Inflamed seborrheic keratosis    Anemia, unspecified    Headache    Encounter for screening for cardiovascular disorders    Other chest pain    Dyspnea, unspecified    Allergic rhinitis, unspecified    Villarreal's esophagus without dysplasia    Headache, unspecified        Immunizations:     zzPrevnar-13 2016    PNEUMOVAX 23 (Pneumococcal PPV23) 2017    Adacel (Tdap) 11/3/2017        Allergies:     Last Reviewed on 2020 09:18 AM by  Scott Adair    Prednisone:          Current Medications:     Last Reviewed on 11/13/2020 09:18 AM by Scott Adair    ZyrTEC 10 mg oral tablet [take 1 tablet (10 mg) by oral route once daily]    Dimenhydrinate 50 mg oral tablet [Take 1 tablet(s) by mouth q 4 to 6 hr prn]    Calcium 600 600 mg calcium (1,500 mg) oral tablet [1 tab daily]    flonase     Protonix 40 mg oral tablet, delayed release (enteric coated) [take 1 tablet (40 mg) by oral route once daily]        Objective:        Exams:     PHYSICAL EXAM:     GENERAL: well developed, well nourished;  no apparent distress;     EYES: extraocular movements intact; conjunctiva and cornea are normal;     RESPIRATORY: normal respiratory rate and pattern with no distress;     LYMPHATIC: no enlargement of cervical or facial nodes; no supraclavicular nodes;     BREAST/INTEGUMENT: SKIN: no significant rashes or lesions; no suspicious moles;     NEUROLOGIC: mental status: alert and oriented x 3; cranial nerves II-XII grossly intact;     PSYCHIATRIC: appropriate affect and demeanor; normal psychomotor function;         Assessment:         K22.70   Villarreal's esophagus without dysplasia       J30.9   Allergic rhinitis, unspecified       R51.9   Headache, unspecified       D12.6   Benign neoplasm of colon, unspecified           ORDERS:         Meds Prescribed:       [Refilled] Protonix 40 mg oral tablet, delayed release (enteric coated) [take 1 tablet (40 mg) by oral route once daily], #90 (ninety) tablets, Refills: 3 (three)       [New Rx] Singulair 10 mg oral tablet [Take 1 tablet(s) by mouth each evening], #30 (thirty) tablets, Refills: 0 (zero)         Radiology/Test Orders:       [id number]  Radiologic examination, sinuses, paranasal, complete, minimum of three views  (Send-Out)              Lab Orders:       [id number]  BDCB2 - [facility] CBC w/o diff  (Send-Out)            24575  COMP - [facility] Comp. Metabolic Panel  (Send-Out)                      Plan:          Villarreal's esophagus without dysplasia     LABORATORY:  Labs ordered to be performed today include CBC W/O DIFF and Comprehensive metabolic panel.      RECOMMENDATIONS given include: Today, we have reviewed Kandice's care.  She is well.  I'm going to refill her medications as noted.  She should stay on something like Protonix for life given the findings on EGD.  Additionally, we will add coverage for her allergies as noted.  If the headache does not seem to improve, then consider sinus X-rays as below.  Overall, she remains in good health..            Prescriptions:       [Refilled] Protonix 40 mg oral tablet, delayed release (enteric coated) [take 1 tablet (40 mg) by oral route once daily], #90 (ninety) tablets, Refills: 3 (three)       [New Rx] Singulair 10 mg oral tablet [Take 1 tablet(s) by mouth each evening], #30 (thirty) tablets, Refills: 0 (zero)           Orders:       [id number]  [facility] CBC w/o diff  (Send-Out)            36933  COMP - [facility] Comp. Metabolic Panel  (Send-Out)              Allergic rhinitis, unspecifiedAs above.        Headache, unspecified        RADIOLOGY:  I have ordered Sinus series xray to be done today.            Orders:       [id number]  Radiologic examination, sinuses, paranasal, complete, minimum of three views  (Send-Out)              Benign neoplasm of colon, unspecifiedWe will contact her in [facility] for repeat exam as per Dr. Wesley.            Patient Recommendations:        For  Headache, unspecified:    I also recommend Sinus series xray.              Charge Capture:         Primary Diagnosis:     K22.70  Villarreal's esophagus without dysplasia           Orders:      33272  Office/outpatient visit; established patient, level 4  (In-House)              J30.9  Allergic rhinitis, unspecified     R51.9  Headache, unspecified     D12.6  Benign neoplasm of colon, unspecified

## 2021-05-18 NOTE — PROGRESS NOTES
Hortencia Parisi  1950     Office/Outpatient Visit    Visit Date: Tue, Dec 31, 2019 11:05 am    Provider: Jarrett Marrero MD (Assistant: Kannan Wheeler, )    Location: [facility]        Electronically signed by Jarrett Marrero MD on  01/05/2020 04:18:17 PM                             Subjective:        CC: Mrs. Parisi is a 69 year old White female.  presents today due to CARMICHAEL This is a follow-up visit.          HPI:       Hortencia is here reporting CARMICHAEL for the past few weeks.  She has been taking flonase and claritin regularly.  HA is around both eyes. Does not feel congested in sinus.  Sometimes nose runs other time dry.She says her  also has been reporting HA.  There are no combustible sources for CO as far as she knows. He of allergies which have resulted in some sinus issues in the past.  Has been using over-the-counter Flonase and Claritin for quite some time.      She does have a prior hx of anemia, and even though she doesn't not look anemic, we did discuss the possibility of how anemia could be associated with HA.      ROS:     CONSTITUTIONAL:  Negative for chills, fatigue, fever, and weight change.      EYES:  Negative for blurred vision.      CARDIOVASCULAR:  Negative for chest pain, orthopnea, paroxysmal nocturnal dyspnea and pedal edema.      RESPIRATORY:  Negative for dyspnea.      GASTROINTESTINAL:  Negative for abdominal pain, constipation, diarrhea, nausea and vomiting.      GENITOURINARY:  Negative for dysuria and frequent urination.      NEUROLOGICAL:  Positive for headaches.      PSYCHIATRIC:  Negative for anxiety, depression, and sleep disturbances.          Past Medical History / Family History / Social History:         Last Reviewed on 12/31/2019 12:12 PM by Jarrett Marrero    Past Medical History:             PREVENTIVE HEALTH MAINTENANCE             BREAST EXAM: with normal results     BONE DENSITY: has never been done     COLORECTAL CANCER  SCREENING: Up to date (colonoscopy q10y; sigmoidoscopy q5y; Cologuard q3y) was last done 2018, Results are in chart; colonoscopy with the following abnormalities noted-- diverticulitis, Polyp(s), and adenomatous-  follow up      MAMMOGRAM: Done within last 2 years and results in are chart was last done 2018 with normal results     PAP SMEAR: was last done dec 2017 with normal results         Surgical History:     Procedures: colonoscopy 07         Family History:     Father:  at age 79;  Aortic Aneurysm; Arrhythmia ( A-fib );  Renal Carcinoma;  COPD     Mother:  at age 75;  Diverticulitis -  of ruptured bowel;  osteoprosis     Brother(s): Healthy; 1 brother(s) total     Sister(s): Healthy; 1 sister(s) total         Tobacco/Alcohol/Supplements:     Last Reviewed on 2019 11:08 AM by Kannan Wheeler    Tobacco: She has never smoked.          Substance Abuse History:     Last Reviewed on 2017 12:23 PM by Jarrett Marrero        Mental Health History:     Last Reviewed on 2017 12:23 PM by Jarrett Marrero        Communicable Diseases (eg STDs):     Last Reviewed on 2017 12:23 PM by Jarrett Marrero        Allergies:     Last Reviewed on 2019 11:08 AM by Kannan Wheeler    Prednisone:          Current Medications:     Last Reviewed on 2019 11:08 AM by Kannan Wheeler    Loratadine 10mg Tablet [1 tab daily]    Dimenhydrinate 50mg Tablet [Take 1 tablet(s) by mouth q 4 to 6 hr prn]    Calcium 600 1.5gm Tablet [1 tab daily]    Multivitamins  Tablet [1 tab daily]    Juice plus q day    flonase        Objective:        Vitals:         Current: 2019 11:10:20 AM    Ht:  5 ft, 8 in;  Wt: 160.4 lbs;  BMI: 24.4T: 98.5 F (oral);  BP: 104/65 mm Hg (left arm, sitting);  P: 95 bpm (left arm (BP Cuff), sitting)        Exams:     PHYSICAL EXAM:     GENERAL: well developed, well nourished;  well groomed;  no apparent distress;     EYES: nonicteric;      E/N/T: EARS:  normal external auditory canals and tympanic membranes;  grossly normal hearing; NOSE: no sinus tenderness; OROPHARYNX:  normal mucosa, dentition, gingiva, and posterior pharynx;     NECK:  supple, full ROM; no thyromegaly; no carotid bruits;     RESPIRATORY: normal appearance and symmetric expansion of chest wall; normal respiratory rate and pattern with no distress; normal breath sounds with no rales, rhonchi, wheezes or rubs;     CARDIOVASCULAR: normal rate; rhythm is regular;  no systolic murmur;     GASTROINTESTINAL: nontender, nondistended; no hepatosplenomegaly or masses; no bruits;     GENITOURINARY: no CVAT;     LYMPHATIC: no enlargement of cervical or facial nodes; no supraclavicular nodes;     MUSCULOSKELETAL: no pedal edema;     NEUROLOGIC: no focal lateralizing deficits.;     PSYCHIATRIC:  appropriate affect and demeanor; normal speech pattern; grossly normal memory;         Assessment:         R51   Headache       D64.9   Anemia, unspecified       Z13.6   Encounter for screening for cardiovascular disorders           ORDERS:         Lab Orders:       [id number]  HTN - [facility] CMP AND LIPID: [id number], [id number]  (Send-Out)            05553  BDCB2 - [facility] CBC w/o diff  (Send-Out)                      Plan:         HeadacheCould be allergic so suggest she try Allegra instead of Claritin, and maybe switch her nasal steroid too.  Can also try adding on singular.  If symptoms don't improve we may want to get imaging of sinuses.     LABORATORY:  Labs ordered to be performed today include HTN/Lipid Panel: CMP, Lipid.            Orders:       [id number]  HTN - [facility] CMP AND LIPID: [id number], [id number]  (Send-Out)              Anemia, unspecified    LABORATORY:  Labs ordered to be performed today include CBC W/O DIFF.            Orders:       [id number]  BDCB2 - [facility] CBC w/o diff  (Send-Out)                  Other Patient Education Handouts:     Dragon transcription disclaimer:        Much of this encounter note is an electronic  transcription/translation of spoken language to printed text.  The electronic translation of spoken language may permit erroneous, or at times, nonsensical words or phrases to be inadvertently transcribed.  Although I have reviewed the note for such errors, some may still exist.        Charge Capture:         Primary Diagnosis:     R51  Headache           Orders:      55594  Office/outpatient visit; established patient, level 4  (In-House)              D64.9  Anemia, unspecified     Z13.6  Encounter for screening for cardiovascular disorders

## 2021-06-09 ENCOUNTER — TRANSCRIBE ORDERS (OUTPATIENT)
Dept: ADMINISTRATIVE | Facility: HOSPITAL | Age: 71
End: 2021-06-09

## 2021-06-09 DIAGNOSIS — Z00.01 ENCOUNTER FOR GENERAL ADULT MEDICAL EXAMINATION WITH ABNORMAL FINDINGS: Primary | ICD-10-CM

## 2021-06-16 RX ORDER — PANTOPRAZOLE SODIUM 40 MG/1
TABLET, DELAYED RELEASE ORAL
COMMUNITY
Start: 2021-06-16 | End: 2021-09-16 | Stop reason: SDUPTHER

## 2021-06-16 RX ORDER — MONTELUKAST SODIUM 10 MG/1
TABLET ORAL
Qty: 90 TABLET | Refills: 0 | Status: SHIPPED | OUTPATIENT
Start: 2021-06-16 | End: 2023-02-16

## 2021-07-01 VITALS
BODY MASS INDEX: 23.76 KG/M2 | TEMPERATURE: 98.1 F | SYSTOLIC BLOOD PRESSURE: 117 MMHG | WEIGHT: 156.8 LBS | HEIGHT: 68 IN | DIASTOLIC BLOOD PRESSURE: 77 MMHG | HEART RATE: 70 BPM

## 2021-07-01 VITALS
HEART RATE: 76 BPM | TEMPERATURE: 97.4 F | HEIGHT: 68 IN | BODY MASS INDEX: 23.64 KG/M2 | DIASTOLIC BLOOD PRESSURE: 81 MMHG | WEIGHT: 156 LBS | SYSTOLIC BLOOD PRESSURE: 122 MMHG

## 2021-07-02 VITALS
TEMPERATURE: 97.9 F | HEART RATE: 68 BPM | HEIGHT: 68 IN | DIASTOLIC BLOOD PRESSURE: 72 MMHG | WEIGHT: 161 LBS | SYSTOLIC BLOOD PRESSURE: 120 MMHG | BODY MASS INDEX: 24.4 KG/M2

## 2021-07-02 VITALS
DIASTOLIC BLOOD PRESSURE: 65 MMHG | SYSTOLIC BLOOD PRESSURE: 104 MMHG | HEIGHT: 68 IN | WEIGHT: 160.4 LBS | TEMPERATURE: 98.5 F | BODY MASS INDEX: 24.31 KG/M2 | HEART RATE: 95 BPM

## 2021-07-02 VITALS
SYSTOLIC BLOOD PRESSURE: 121 MMHG | TEMPERATURE: 97.9 F | HEIGHT: 68 IN | DIASTOLIC BLOOD PRESSURE: 66 MMHG | HEART RATE: 87 BPM | BODY MASS INDEX: 23.86 KG/M2 | WEIGHT: 157.4 LBS

## 2021-09-16 RX ORDER — PANTOPRAZOLE SODIUM 40 MG/1
40 TABLET, DELAYED RELEASE ORAL DAILY
Qty: 90 TABLET | Refills: 0 | Status: SHIPPED | OUTPATIENT
Start: 2021-09-16 | End: 2021-12-16 | Stop reason: SDUPTHER

## 2021-09-16 NOTE — TELEPHONE ENCOUNTER
Caller: Hortencia Pairsi    Relationship: Self    Best call back number: [phone]    Medication needed:   Requested Prescriptions     Pending Prescriptions Disp Refills   • pantoprazole (PROTONIX) 40 MG EC tablet         When do you need the refill by: ASAP    What additional details did the patient provide when requesting the medication:   PATIENT STATED THAT THE PRESCRIPTION WAS CALLED INTO [facility] AND THEY HAVE CLOSED DOWN THEIR DRIVE THRU WINDOW.  PATIENT WOULD LIKE THIS PRESCRIPTION RECALLED INTO THE PHARMACY RIGHT THERE IN [place] [facility].      Does the patient have less than a 3 day supply:  [x] Yes  [] No    What is the patient's preferred pharmacy:      [facility].

## 2021-09-16 NOTE — TELEPHONE ENCOUNTER
I did send a refill for 90 days to the pharmacy here.  I would recommend she see me in the next couple of months for recheck.  Thanks.

## 2021-12-16 RX ORDER — PANTOPRAZOLE SODIUM 40 MG/1
40 TABLET, DELAYED RELEASE ORAL DAILY
Qty: 90 TABLET | Refills: 0 | Status: SHIPPED | OUTPATIENT
Start: 2021-12-16 | End: 2022-01-17 | Stop reason: SDUPTHER

## 2022-01-17 RX ORDER — PANTOPRAZOLE SODIUM 40 MG/1
40 TABLET, DELAYED RELEASE ORAL DAILY
Qty: 90 TABLET | Refills: 0 | Status: SHIPPED | OUTPATIENT
Start: 2022-01-17 | End: 2022-02-18 | Stop reason: SDUPTHER

## 2022-01-17 NOTE — TELEPHONE ENCOUNTER
Caller: Hortencia Parisi    Relationship: Self    Best call back number: [phone]    Requested Prescriptions:   Requested Prescriptions     Pending Prescriptions Disp Refills   • pantoprazole (PROTONIX) 40 MG EC tablet 90 tablet 0     Sig: Take 1 tablet by mouth Daily.        Pharmacy where request should be sent: [facility]     Additional details provided by patient:  PATIENT HAS ENOUGH TO GET THROUGH THE WEEK. HAD TO CANCEL APPOINTMENT DUE TO COVID POSITIVE TEST     Does the patient have less than a 3 day supply:  [] Yes  [] No    Donis IVORY Rep   01/17/22 11:49 EST

## 2022-02-18 ENCOUNTER — OFFICE VISIT (OUTPATIENT)
Dept: FAMILY MEDICINE CLINIC | Age: 72
End: 2022-02-18

## 2022-02-18 ENCOUNTER — LAB (OUTPATIENT)
Dept: LAB | Facility: HOSPITAL | Age: 72
End: 2022-02-18

## 2022-02-18 VITALS
HEART RATE: 94 BPM | OXYGEN SATURATION: 95 % | SYSTOLIC BLOOD PRESSURE: 125 MMHG | DIASTOLIC BLOOD PRESSURE: 81 MMHG | WEIGHT: 149.4 LBS | BODY MASS INDEX: 22.64 KG/M2 | HEIGHT: 68 IN

## 2022-02-18 DIAGNOSIS — D50.0 IRON DEFICIENCY ANEMIA DUE TO CHRONIC BLOOD LOSS: ICD-10-CM

## 2022-02-18 DIAGNOSIS — Z72.9 PROBLEM RELATED TO LIFESTYLE: ICD-10-CM

## 2022-02-18 DIAGNOSIS — R06.02 SHORTNESS OF BREATH: ICD-10-CM

## 2022-02-18 DIAGNOSIS — Z23 NEED FOR VACCINATION: ICD-10-CM

## 2022-02-18 DIAGNOSIS — D12.2 ADENOMATOUS POLYP OF ASCENDING COLON: ICD-10-CM

## 2022-02-18 DIAGNOSIS — R53.83 FATIGUE, UNSPECIFIED TYPE: ICD-10-CM

## 2022-02-18 DIAGNOSIS — Z78.0 POSTMENOPAUSAL STATE: ICD-10-CM

## 2022-02-18 DIAGNOSIS — Z13.6 SCREENING FOR CARDIOVASCULAR CONDITION: ICD-10-CM

## 2022-02-18 DIAGNOSIS — Z12.31 ENCOUNTER FOR SCREENING MAMMOGRAM FOR MALIGNANT NEOPLASM OF BREAST: ICD-10-CM

## 2022-02-18 DIAGNOSIS — Z28.81 VACCINATION HESITANCY BY PATIENT: ICD-10-CM

## 2022-02-18 DIAGNOSIS — K22.70 BARRETT'S ESOPHAGUS WITHOUT DYSPLASIA: Primary | ICD-10-CM

## 2022-02-18 PROBLEM — K63.5 COLON POLYP: Status: ACTIVE | Noted: 2022-02-18

## 2022-02-18 LAB
BASOPHILS # BLD AUTO: 0.03 10*3/MM3 (ref 0–0.2)
BASOPHILS NFR BLD AUTO: 0.6 % (ref 0–1.5)
DEPRECATED RDW RBC AUTO: 50.1 FL (ref 37–54)
EOSINOPHIL # BLD AUTO: 0.14 10*3/MM3 (ref 0–0.4)
EOSINOPHIL NFR BLD AUTO: 3 % (ref 0.3–6.2)
ERYTHROCYTE [DISTWIDTH] IN BLOOD BY AUTOMATED COUNT: 15.7 % (ref 12.3–15.4)
HCT VFR BLD AUTO: 33.6 % (ref 34–46.6)
HCV AB SER DONR QL: NORMAL
HGB BLD-MCNC: 10.5 G/DL (ref 12–15.9)
IMM GRANULOCYTES # BLD AUTO: 0 10*3/MM3 (ref 0–0.05)
IMM GRANULOCYTES NFR BLD AUTO: 0 % (ref 0–0.5)
LYMPHOCYTES # BLD AUTO: 1.32 10*3/MM3 (ref 0.7–3.1)
LYMPHOCYTES NFR BLD AUTO: 27.8 % (ref 19.6–45.3)
MCH RBC QN AUTO: 27.2 PG (ref 26.6–33)
MCHC RBC AUTO-ENTMCNC: 31.3 G/DL (ref 31.5–35.7)
MCV RBC AUTO: 87 FL (ref 79–97)
MONOCYTES # BLD AUTO: 0.37 10*3/MM3 (ref 0.1–0.9)
MONOCYTES NFR BLD AUTO: 7.8 % (ref 5–12)
NEUTROPHILS NFR BLD AUTO: 2.88 10*3/MM3 (ref 1.7–7)
NEUTROPHILS NFR BLD AUTO: 60.8 % (ref 42.7–76)
PLATELET # BLD AUTO: 261 10*3/MM3 (ref 140–450)
PMV BLD AUTO: 8.3 FL (ref 6–12)
RBC # BLD AUTO: 3.86 10*6/MM3 (ref 3.77–5.28)
RETICS # AUTO: 0.05 10*6/MM3 (ref 0.02–0.13)
RETICS/RBC NFR AUTO: 1.2 % (ref 0.7–1.9)
WBC NRBC COR # BLD: 4.74 10*3/MM3 (ref 3.4–10.8)

## 2022-02-18 PROCEDURE — 84466 ASSAY OF TRANSFERRIN: CPT

## 2022-02-18 PROCEDURE — 84443 ASSAY THYROID STIM HORMONE: CPT | Performed by: FAMILY MEDICINE

## 2022-02-18 PROCEDURE — 80061 LIPID PANEL: CPT | Performed by: FAMILY MEDICINE

## 2022-02-18 PROCEDURE — 99214 OFFICE O/P EST MOD 30 MIN: CPT | Performed by: FAMILY MEDICINE

## 2022-02-18 PROCEDURE — 83540 ASSAY OF IRON: CPT

## 2022-02-18 PROCEDURE — 85045 AUTOMATED RETICULOCYTE COUNT: CPT

## 2022-02-18 PROCEDURE — 80053 COMPREHEN METABOLIC PANEL: CPT | Performed by: FAMILY MEDICINE

## 2022-02-18 PROCEDURE — 82728 ASSAY OF FERRITIN: CPT

## 2022-02-18 PROCEDURE — 83880 ASSAY OF NATRIURETIC PEPTIDE: CPT | Performed by: FAMILY MEDICINE

## 2022-02-18 PROCEDURE — 85025 COMPLETE CBC W/AUTO DIFF WBC: CPT

## 2022-02-18 PROCEDURE — 86803 HEPATITIS C AB TEST: CPT

## 2022-02-18 PROCEDURE — 36415 COLL VENOUS BLD VENIPUNCTURE: CPT | Performed by: FAMILY MEDICINE

## 2022-02-18 RX ORDER — FERROUS SULFATE 325(65) MG
325 TABLET ORAL WEEKLY
Qty: 100 TABLET | Refills: 0 | Status: SHIPPED | OUTPATIENT
Start: 2022-02-18

## 2022-02-18 RX ORDER — FLUTICASONE PROPIONATE 50 MCG
2 SPRAY, SUSPENSION (ML) NASAL DAILY
COMMUNITY

## 2022-02-18 RX ORDER — FAMOTIDINE 40 MG/1
40 TABLET, FILM COATED ORAL NIGHTLY
Qty: 90 TABLET | Refills: 1 | Status: SHIPPED | OUTPATIENT
Start: 2022-02-18 | End: 2022-08-15 | Stop reason: SDUPTHER

## 2022-02-18 RX ORDER — PANTOPRAZOLE SODIUM 40 MG/1
40 TABLET, DELAYED RELEASE ORAL DAILY
Qty: 90 TABLET | Refills: 0 | Status: SHIPPED | OUTPATIENT
Start: 2022-02-18 | End: 2022-07-19 | Stop reason: SDUPTHER

## 2022-02-18 RX ORDER — SODIUM PHOSPHATE,MONO-DIBASIC 19G-7G/118
1 ENEMA (ML) RECTAL DAILY
COMMUNITY

## 2022-02-18 NOTE — PROGRESS NOTES
"Chief Complaint  Allergies, Heartburn, needing to be schedule mammo and dexa, and PT HAS NOT HAD COVID OR FLU VACCINE AND DECLINED    Subjective          Hortencia Parisi presents to [facility]  History of Present Illness  pete      Current Outpatient Medications   Medication Sig Dispense Refill   • Calcium Carbonate-Vit D-Min (CALCIUM 1200 PO) Take  by mouth Daily.     • dimenhyDRINATE (DRAMAMINE PO) Take  by mouth As Needed (vertigo).     • fluticasone (FLONASE) 50 MCG/ACT nasal spray 2 sprays into the nostril(s) as directed by provider Daily.     • glucosamine-chondroitin 500-400 MG capsule capsule Take 1 capsule by mouth Daily.     • pantoprazole (PROTONIX) 40 MG EC tablet Take 1 tablet by mouth Daily. 90 tablet 0   • montelukast (SINGULAIR) 10 MG tablet TAKE ONE TABLET BY MOUTH EVERY EVENING 90 tablet 0     No current facility-administered medications for this visit.       Review of Systems         Objective   Vital Signs:   /81   Pulse 94   Ht 172.7 cm (67.99\")   Wt 67.8 kg (149 lb 6.4 oz)   SpO2 95%   BMI 22.72 kg/m²     Physical Exam       Result Review :                     Assessment and Plan    There are no diagnoses linked to this encounter.    Follow Up   No follow-ups on file.  Patient was given instructions and counseling regarding her condition or for health maintenance advice. Please see specific information pulled into the AVS if appropriate.       "

## 2022-02-18 NOTE — PROGRESS NOTES
"Chief Complaint  Allergies, Heartburn, needing to be schedule mammo and dexa, and PT HAS NOT HAD COVID OR FLU VACCINE AND DECLINED    Subjective          Hortencia Parisi presents to [facility]  History of Present Illness  The patient has consented to being recorded using JENNY.    The patient is required to get a colonoscopy and EGD every 3 years with Dr. Wesley. She was diagnoses with Villarreal's esophagus and 2 polyps from her most recent colonoscopy.     She reports having associated chest pressure and dyspnea with her Villarreal's esophagus. She takes pantoprazole in the morning, with minimal improvement. The patient admits to modifying her diet and use of essential oils, with no improvement. She denies use of Pepcid and Zantac. She only drinks 0.5 cups of coffee daily.     The patients blood pressure is stable. However, she has an iron deficiency and is borderline anemic. She was previously on an iron supplement, and she did not like the side effect of belching. She admits to weight loss and loss of appetite. She denies having heartburn. Her pain is exacerbated with exertion and at rest. The patient was seen with \"Scott\" at the hospital and obtained a stress test and EKG, with no abnormal findings.     Additionally, she denies obtaining her mammogram and Dexa scan that was ordered by Dr. Alcantar in 04/2021. She has not obtained her influenza or COVID vaccines, and strong advises against them. The patent reports having the COVID virus previously, and only had symptoms of a headache.       Current Outpatient Medications   Medication Sig Dispense Refill   • Calcium Carbonate-Vit D-Min (CALCIUM 1200 PO) Take  by mouth Daily.     • dimenhyDRINATE (DRAMAMINE PO) Take  by mouth As Needed (vertigo).     • fluticasone (FLONASE) 50 MCG/ACT nasal spray 2 sprays into the nostril(s) as directed by provider Daily.     • glucosamine-chondroitin 500-400 MG capsule capsule Take 1 capsule by mouth " "Daily.     • pantoprazole (PROTONIX) 40 MG EC tablet Take 1 tablet by mouth Daily. 90 tablet 0   • famotidine (Pepcid) 40 MG tablet Take 1 tablet by mouth Every Night. 90 tablet 1   • ferrous sulfate (FerrouSul) 325 (65 FE) MG tablet Take 1 tablet by mouth 1 (One) Time Per Week. Take with Orange Juice 100 tablet 0   • montelukast (SINGULAIR) 10 MG tablet TAKE ONE TABLET BY MOUTH EVERY EVENING 90 tablet 0     No current facility-administered medications for this visit.       Review of Systems   Constitutional: Negative for chills and fever.   Respiratory: Negative for chest tightness and shortness of breath.    Cardiovascular: Negative for chest pain.   Gastrointestinal: Negative for abdominal pain, diarrhea, nausea and vomiting.   Genitourinary: Negative for dysuria and hematuria.   Neurological: Negative for headache and confusion.   Psychiatric/Behavioral: Negative for agitation, hallucinations and suicidal ideas.            Objective   Vital Signs:   /81   Pulse 94   Ht 172.7 cm (67.99\")   Wt 67.8 kg (149 lb 6.4 oz)   SpO2 95%   BMI 22.72 kg/m²     Physical Exam   Constitutional: Patient is in no acute distress.  Eyes: Nonicteric bilaterally.   Ears: Tympanic membranes are clear with normal ear canal.   Throat: Oral pharynx is clear.   Neck: Supple without palpable adenopathy. Thyroid is nonenlarged, nontender and no masses. No supraclavicular adenopathy.  Heart: Regular rate and rhythm without murmur, gallop or rub.  Lungs: Good air movement bilaterally, clear without crackles or wheeze.  Abdomen: Bowel sounds are positive. The abdomen is soft and nontender. No organomegaly or palpable mass.   Extremities: Without pedal edema.   Neurologic: No lateralizing focal neurologic deficit.   Psychiatric: Patient has normal mood, attention, and behavior with normal thought content.    Result Review :                     Assessment and Plan    Diagnoses and all orders for this visit:    1. Villarreal's esophagus " without dysplasia (Primary)  We will add on Pepcid at at bedtime.  Also reemphasized importance of mechanical/behavioral inventions.  Needs to continue to follow with gastroenterology as recommended  -     famotidine (Pepcid) 40 MG tablet; Take 1 tablet by mouth Every Night.  Dispense: 90 tablet; Refill: 1  -     ferrous sulfate (FerrouSul) 325 (65 FE) MG tablet; Take 1 tablet by mouth 1 (One) Time Per Week. Take with [place] Juice  Dispense: 100 tablet; Refill: 0  -     pantoprazole (PROTONIX) 40 MG EC tablet; Take 1 tablet by mouth Daily.  Dispense: 90 tablet; Refill: 0    2. Adenomatous polyp of ascending colon  Did review the results for a previous colonoscopy.  She says she has had a longstanding history of anemia.    3. Iron deficiency anemia due to chronic blood loss  She states she has had a longstanding history of anemia.  Does not tolerate the iron well.  Willing to take the iron but only on a weekly basis initially.  -     Iron Profile; Future  -     Ferritin; Future  -     CBC & Differential; Future  -     Reticulocytes; Future  -     Comprehensive Metabolic Panel  -     TSH  -     ferrous sulfate (FerrouSul) 325 (65 FE) MG tablet; Take 1 tablet by mouth 1 (One) Time Per Week. Take with [place] Juice  Dispense: 100 tablet; Refill: 0    4. Problem related to lifestyle  -     Hepatitis C Antibody; Future    5. Need for vaccination   -  Strongly encouraged the patient to receive her influenza and COVID vaccines.     6. Postmenopausal state  -     Lipid Panel  -     DEXA Bone Density Axial; Future    7. Encounter for screening mammogram for malignant neoplasm of breast  -     Mammo Screening Digital Tomosynthesis Bilateral With CAD; Future    8. Shortness of breath  -     BNP    9. Fatigue, unspecified type  -     TSH    10. Screening for cardiovascular condition  -     Lipid Panel        Follow Up   No follow-ups on file.  Patient was given instructions and counseling regarding her condition or for health  maintenance advice. Please see specific information pulled into the AVS if appropriate.     Transcribed from ambient dictation for Jarrett Marrero MD by Liza Guthrie.  02/18/22   15:45 EST    Patient verbalized consent to the visit recording.

## 2022-02-19 PROBLEM — Z28.21 VACCINATION HESITANCY BY PATIENT: Status: ACTIVE | Noted: 2022-02-19

## 2022-02-19 PROBLEM — Z28.81 VACCINATION HESITANCY BY PATIENT: Status: ACTIVE | Noted: 2022-02-19

## 2022-02-19 LAB
ALBUMIN SERPL-MCNC: 4.7 G/DL (ref 3.5–5.2)
ALBUMIN/GLOB SERPL: 1.7 G/DL
ALP SERPL-CCNC: 59 U/L (ref 39–117)
ALT SERPL W P-5'-P-CCNC: 13 U/L (ref 1–33)
ANION GAP SERPL CALCULATED.3IONS-SCNC: 8 MMOL/L (ref 5–15)
AST SERPL-CCNC: 21 U/L (ref 1–32)
BILIRUB SERPL-MCNC: 0.2 MG/DL (ref 0–1.2)
BUN SERPL-MCNC: 11 MG/DL (ref 8–23)
BUN/CREAT SERPL: 13.8 (ref 7–25)
CALCIUM SPEC-SCNC: 10.1 MG/DL (ref 8.6–10.5)
CHLORIDE SERPL-SCNC: 102 MMOL/L (ref 98–107)
CHOLEST SERPL-MCNC: 217 MG/DL (ref 0–200)
CO2 SERPL-SCNC: 28 MMOL/L (ref 22–29)
CREAT SERPL-MCNC: 0.8 MG/DL (ref 0.57–1)
FERRITIN SERPL-MCNC: 48.8 NG/ML (ref 13–150)
GFR SERPL CREATININE-BSD FRML MDRD: 71 ML/MIN/1.73
GLOBULIN UR ELPH-MCNC: 2.7 GM/DL
GLUCOSE SERPL-MCNC: 83 MG/DL (ref 65–99)
HDLC SERPL-MCNC: 81 MG/DL (ref 40–60)
IRON 24H UR-MRATE: 28 MCG/DL (ref 37–145)
IRON SATN MFR SERPL: 7 % (ref 20–50)
LDLC SERPL CALC-MCNC: 121 MG/DL (ref 0–100)
LDLC/HDLC SERPL: 1.46 {RATIO}
NT-PROBNP SERPL-MCNC: 80 PG/ML (ref 0–900)
POTASSIUM SERPL-SCNC: 4.7 MMOL/L (ref 3.5–5.2)
PROT SERPL-MCNC: 7.4 G/DL (ref 6–8.5)
SODIUM SERPL-SCNC: 138 MMOL/L (ref 136–145)
TIBC SERPL-MCNC: 389 MCG/DL (ref 298–536)
TRANSFERRIN SERPL-MCNC: 261 MG/DL (ref 200–360)
TRIGL SERPL-MCNC: 88 MG/DL (ref 0–150)
TSH SERPL DL<=0.05 MIU/L-ACNC: 2.46 UIU/ML (ref 0.27–4.2)
VLDLC SERPL-MCNC: 15 MG/DL (ref 5–40)

## 2022-02-28 ENCOUNTER — HOSPITAL ENCOUNTER (OUTPATIENT)
Dept: BONE DENSITY | Facility: HOSPITAL | Age: 72
Discharge: HOME | End: 2022-02-28

## 2022-02-28 ENCOUNTER — HOSPITAL ENCOUNTER (OUTPATIENT)
Dept: MAMMOGRAPHY | Facility: HOSPITAL | Age: 72
Discharge: HOME | End: 2022-02-28

## 2022-02-28 DIAGNOSIS — Z78.0 POSTMENOPAUSAL STATE: ICD-10-CM

## 2022-02-28 DIAGNOSIS — Z12.31 ENCOUNTER FOR SCREENING MAMMOGRAM FOR MALIGNANT NEOPLASM OF BREAST: ICD-10-CM

## 2022-02-28 PROCEDURE — 77080 DXA BONE DENSITY AXIAL: CPT

## 2022-02-28 PROCEDURE — 77063 BREAST TOMOSYNTHESIS BI: CPT

## 2022-02-28 PROCEDURE — 77067 SCR MAMMO BI INCL CAD: CPT

## 2022-07-15 ENCOUNTER — TELEPHONE (OUTPATIENT)
Dept: FAMILY MEDICINE CLINIC | Age: 72
End: 2022-07-15

## 2022-07-15 NOTE — TELEPHONE ENCOUNTER
Caller: Hortencia Parisi    Relationship: Self    Best call back number: [phone]    What is the best time to reach you: ANY    Who are you requesting to speak with (clinical staff, provider,  specific staff member): CLINICAL STAFF    What was the call regarding: PATIENT WOULD LIKE TO SPEAK TO A NURSE ABOUT THE LAST TIME SHE HAD A TETANUS SHOT    Do you require a callback: YES

## 2022-07-19 DIAGNOSIS — K22.70 BARRETT'S ESOPHAGUS WITHOUT DYSPLASIA: ICD-10-CM

## 2022-07-19 RX ORDER — PANTOPRAZOLE SODIUM 40 MG/1
40 TABLET, DELAYED RELEASE ORAL DAILY
Qty: 90 TABLET | Refills: 0 | Status: SHIPPED | OUTPATIENT
Start: 2022-07-19 | End: 2022-10-10 | Stop reason: SDUPTHER

## 2022-08-15 DIAGNOSIS — K22.70 BARRETT'S ESOPHAGUS WITHOUT DYSPLASIA: ICD-10-CM

## 2022-08-17 RX ORDER — FAMOTIDINE 40 MG/1
40 TABLET, FILM COATED ORAL NIGHTLY
Qty: 90 TABLET | Refills: 1 | Status: SHIPPED | OUTPATIENT
Start: 2022-08-17 | End: 2023-02-16 | Stop reason: SDUPTHER

## 2022-10-10 DIAGNOSIS — K22.70 BARRETT'S ESOPHAGUS WITHOUT DYSPLASIA: ICD-10-CM

## 2022-10-10 RX ORDER — PANTOPRAZOLE SODIUM 40 MG/1
40 TABLET, DELAYED RELEASE ORAL DAILY
Qty: 90 TABLET | Refills: 0 | Status: SHIPPED | OUTPATIENT
Start: 2022-10-10 | End: 2023-01-05 | Stop reason: SDUPTHER

## 2023-01-05 DIAGNOSIS — K22.70 BARRETT'S ESOPHAGUS WITHOUT DYSPLASIA: ICD-10-CM

## 2023-01-06 RX ORDER — PANTOPRAZOLE SODIUM 40 MG/1
40 TABLET, DELAYED RELEASE ORAL DAILY
Qty: 90 TABLET | Refills: 0 | Status: SHIPPED | OUTPATIENT
Start: 2023-01-06

## 2023-01-25 ENCOUNTER — TRANSCRIBE ORDERS (OUTPATIENT)
Dept: ADMINISTRATIVE | Facility: HOSPITAL | Age: 73
End: 2023-01-25
Payer: MEDICARE

## 2023-01-25 DIAGNOSIS — Z12.31 VISIT FOR SCREENING MAMMOGRAM: Primary | ICD-10-CM

## 2023-02-14 ENCOUNTER — OFFICE (AMBULATORY)
Dept: URBAN - METROPOLITAN AREA CLINIC 76 | Facility: CLINIC | Age: 73
End: 2023-02-14
Payer: COMMERCIAL

## 2023-02-14 VITALS
HEIGHT: 69 IN | DIASTOLIC BLOOD PRESSURE: 82 MMHG | SYSTOLIC BLOOD PRESSURE: 124 MMHG | HEART RATE: 69 BPM | OXYGEN SATURATION: 96 % | WEIGHT: 156.8 LBS

## 2023-02-14 DIAGNOSIS — Z86.010 PERSONAL HISTORY OF COLONIC POLYPS: ICD-10-CM

## 2023-02-14 DIAGNOSIS — K22.70 BARRETT'S ESOPHAGUS WITHOUT DYSPLASIA: ICD-10-CM

## 2023-02-14 DIAGNOSIS — K62.1 RECTAL POLYP: ICD-10-CM

## 2023-02-14 DIAGNOSIS — K63.5 POLYP OF COLON: ICD-10-CM

## 2023-02-14 DIAGNOSIS — R14.2 ERUCTATION: ICD-10-CM

## 2023-02-14 DIAGNOSIS — R07.89 OTHER CHEST PAIN: ICD-10-CM

## 2023-02-14 DIAGNOSIS — K21.9 GASTRO-ESOPHAGEAL REFLUX DISEASE WITHOUT ESOPHAGITIS: ICD-10-CM

## 2023-02-14 DIAGNOSIS — K57.30 DIVERTICULOSIS OF LARGE INTESTINE WITHOUT PERFORATION OR ABS: ICD-10-CM

## 2023-02-14 PROBLEM — K31.89 OTHER DISEASES OF STOMACH AND DUODENUM: Status: ACTIVE | Noted: 2020-03-12

## 2023-02-14 PROBLEM — K64.9 UNSPECIFIED HEMORRHOIDS: Status: ACTIVE | Noted: 2018-03-15

## 2023-02-14 PROBLEM — K22.8 OTHER SPECIFIED DISEASES OF ESOPHAGUS: Status: ACTIVE | Noted: 2020-03-12

## 2023-02-14 PROBLEM — D12.0 BENIGN NEOPLASM OF CECUM: Status: ACTIVE | Noted: 2018-03-15

## 2023-02-14 PROBLEM — Z12.11 SCREENING FOR COLONIC NEOPLASIA- AVERAGE RISK: Status: ACTIVE | Noted: 2018-03-15

## 2023-02-14 PROBLEM — R10.13 ABDOMINAL PAIN - EPIGASTRIC: Status: ACTIVE | Noted: 2020-03-12

## 2023-02-14 PROBLEM — D12.2 BENIGN NEOPLASM OF ASCENDING COLON: Status: ACTIVE | Noted: 2018-03-15

## 2023-02-14 PROBLEM — D12.5 BENIGN NEOPLASM OF SIGMOID COLON: Status: ACTIVE | Noted: 2018-03-15

## 2023-02-14 PROCEDURE — 99214 OFFICE O/P EST MOD 30 MIN: CPT | Performed by: NURSE PRACTITIONER

## 2023-02-16 ENCOUNTER — OFFICE VISIT (OUTPATIENT)
Dept: FAMILY MEDICINE CLINIC | Age: 73
End: 2023-02-16
Payer: MEDICARE

## 2023-02-16 ENCOUNTER — LAB (OUTPATIENT)
Dept: LAB | Facility: HOSPITAL | Age: 73
End: 2023-02-16
Payer: MEDICARE

## 2023-02-16 VITALS
HEIGHT: 68 IN | HEART RATE: 69 BPM | TEMPERATURE: 98.1 F | SYSTOLIC BLOOD PRESSURE: 126 MMHG | DIASTOLIC BLOOD PRESSURE: 78 MMHG | BODY MASS INDEX: 23.51 KG/M2 | WEIGHT: 155.13 LBS | OXYGEN SATURATION: 95 %

## 2023-02-16 DIAGNOSIS — K22.70 BARRETT'S ESOPHAGUS WITHOUT DYSPLASIA: ICD-10-CM

## 2023-02-16 DIAGNOSIS — Z28.81 VACCINATION HESITANCY BY PATIENT: ICD-10-CM

## 2023-02-16 DIAGNOSIS — D50.0 IRON DEFICIENCY ANEMIA DUE TO CHRONIC BLOOD LOSS: ICD-10-CM

## 2023-02-16 DIAGNOSIS — Z00.00 MEDICARE ANNUAL WELLNESS VISIT, SUBSEQUENT: Primary | ICD-10-CM

## 2023-02-16 LAB
ALBUMIN SERPL-MCNC: 4.7 G/DL (ref 3.5–5.2)
ALBUMIN/GLOB SERPL: 1.9 G/DL
ALP SERPL-CCNC: 53 U/L (ref 39–117)
ALT SERPL W P-5'-P-CCNC: 9 U/L (ref 1–33)
ANION GAP SERPL CALCULATED.3IONS-SCNC: 8 MMOL/L (ref 5–15)
AST SERPL-CCNC: 19 U/L (ref 1–32)
BASOPHILS # BLD AUTO: 0.03 10*3/MM3 (ref 0–0.2)
BASOPHILS NFR BLD AUTO: 0.7 % (ref 0–1.5)
BILIRUB SERPL-MCNC: 0.3 MG/DL (ref 0–1.2)
BUN SERPL-MCNC: 11 MG/DL (ref 8–23)
BUN/CREAT SERPL: 13.3 (ref 7–25)
CALCIUM SPEC-SCNC: 9.7 MG/DL (ref 8.6–10.5)
CHLORIDE SERPL-SCNC: 102 MMOL/L (ref 98–107)
CO2 SERPL-SCNC: 28 MMOL/L (ref 22–29)
CREAT SERPL-MCNC: 0.83 MG/DL (ref 0.57–1)
DEPRECATED RDW RBC AUTO: 46.8 FL (ref 37–54)
EGFRCR SERPLBLD CKD-EPI 2021: 75.9 ML/MIN/1.73
EOSINOPHIL # BLD AUTO: 0.07 10*3/MM3 (ref 0–0.4)
EOSINOPHIL NFR BLD AUTO: 1.6 % (ref 0.3–6.2)
ERYTHROCYTE [DISTWIDTH] IN BLOOD BY AUTOMATED COUNT: 14.2 % (ref 12.3–15.4)
FERRITIN SERPL-MCNC: 60.5 NG/ML (ref 13–150)
FOLATE SERPL-MCNC: 15.7 NG/ML (ref 4.78–24.2)
GLOBULIN UR ELPH-MCNC: 2.5 GM/DL
GLUCOSE SERPL-MCNC: 89 MG/DL (ref 65–99)
HCT VFR BLD AUTO: 34 % (ref 34–46.6)
HGB BLD-MCNC: 11 G/DL (ref 12–15.9)
IMM GRANULOCYTES # BLD AUTO: 0 10*3/MM3 (ref 0–0.05)
IMM GRANULOCYTES NFR BLD AUTO: 0 % (ref 0–0.5)
IRON 24H UR-MRATE: 40 MCG/DL (ref 37–145)
IRON SATN MFR SERPL: 10 % (ref 20–50)
LYMPHOCYTES # BLD AUTO: 1.34 10*3/MM3 (ref 0.7–3.1)
LYMPHOCYTES NFR BLD AUTO: 31.4 % (ref 19.6–45.3)
MCH RBC QN AUTO: 28.8 PG (ref 26.6–33)
MCHC RBC AUTO-ENTMCNC: 32.4 G/DL (ref 31.5–35.7)
MCV RBC AUTO: 89 FL (ref 79–97)
MONOCYTES # BLD AUTO: 0.23 10*3/MM3 (ref 0.1–0.9)
MONOCYTES NFR BLD AUTO: 5.4 % (ref 5–12)
NEUTROPHILS NFR BLD AUTO: 2.6 10*3/MM3 (ref 1.7–7)
NEUTROPHILS NFR BLD AUTO: 60.9 % (ref 42.7–76)
PLATELET # BLD AUTO: 211 10*3/MM3 (ref 140–450)
PMV BLD AUTO: 8.4 FL (ref 6–12)
POTASSIUM SERPL-SCNC: 4.1 MMOL/L (ref 3.5–5.2)
PROT SERPL-MCNC: 7.2 G/DL (ref 6–8.5)
RBC # BLD AUTO: 3.82 10*6/MM3 (ref 3.77–5.28)
RETICS # AUTO: 0.04 10*6/MM3 (ref 0.02–0.13)
RETICS/RBC NFR AUTO: 1.15 % (ref 0.7–1.9)
SODIUM SERPL-SCNC: 138 MMOL/L (ref 136–145)
TIBC SERPL-MCNC: 386 MCG/DL (ref 298–536)
TRANSFERRIN SERPL-MCNC: 259 MG/DL (ref 200–360)
VIT B12 BLD-MCNC: 313 PG/ML (ref 211–946)
WBC NRBC COR # BLD: 4.27 10*3/MM3 (ref 3.4–10.8)

## 2023-02-16 PROCEDURE — 84466 ASSAY OF TRANSFERRIN: CPT | Performed by: FAMILY MEDICINE

## 2023-02-16 PROCEDURE — 99213 OFFICE O/P EST LOW 20 MIN: CPT | Performed by: FAMILY MEDICINE

## 2023-02-16 PROCEDURE — 85025 COMPLETE CBC W/AUTO DIFF WBC: CPT | Performed by: FAMILY MEDICINE

## 2023-02-16 PROCEDURE — 36415 COLL VENOUS BLD VENIPUNCTURE: CPT | Performed by: FAMILY MEDICINE

## 2023-02-16 PROCEDURE — 82607 VITAMIN B-12: CPT | Performed by: FAMILY MEDICINE

## 2023-02-16 PROCEDURE — 82746 ASSAY OF FOLIC ACID SERUM: CPT | Performed by: FAMILY MEDICINE

## 2023-02-16 PROCEDURE — G0439 PPPS, SUBSEQ VISIT: HCPCS | Performed by: FAMILY MEDICINE

## 2023-02-16 PROCEDURE — 80053 COMPREHEN METABOLIC PANEL: CPT | Performed by: FAMILY MEDICINE

## 2023-02-16 PROCEDURE — 85045 AUTOMATED RETICULOCYTE COUNT: CPT | Performed by: FAMILY MEDICINE

## 2023-02-16 PROCEDURE — 82728 ASSAY OF FERRITIN: CPT | Performed by: FAMILY MEDICINE

## 2023-02-16 PROCEDURE — 1170F FXNL STATUS ASSESSED: CPT | Performed by: FAMILY MEDICINE

## 2023-02-16 PROCEDURE — 1159F MED LIST DOCD IN RCRD: CPT | Performed by: FAMILY MEDICINE

## 2023-02-16 PROCEDURE — 83540 ASSAY OF IRON: CPT | Performed by: FAMILY MEDICINE

## 2023-02-16 RX ORDER — FAMOTIDINE 40 MG/1
40 TABLET, FILM COATED ORAL NIGHTLY
Qty: 90 TABLET | Refills: 1 | Status: SHIPPED | OUTPATIENT
Start: 2023-02-16

## 2023-02-16 NOTE — ASSESSMENT & PLAN NOTE
Not interested in the flu shot or COVID vaccination.  Did have covid x 2 and did fine.  I did inform her that availability of therapeutics for treatment of COVID but the necessity to test early and get initiated on treatment within 5 days.  Also informed her she is eligible for the shingles vaccines which are now covered by Medicare to pharmacy

## 2023-02-16 NOTE — PROGRESS NOTES
The ABCs of the Annual Wellness Visit  Subsequent Medicare Wellness Visit    Subjective    Hortencia Parisi is a 72 y.o. female who presents for a Subsequent Medicare Wellness Visit.    The following portions of the patient's history were reviewed and   updated as appropriate: allergies, current medications, past family history, past medical history, past social history, past surgical history and problem list.    Compared to one year ago, the patient feels her physical   health is better.    Compared to one year ago, the patient feels her mental   health is the same.    Recent Hospitalizations:  She was not admitted to the hospital during the last year.       Current Medical Providers:  Patient Care Team:  Jarrett Marrero MD as PCP - General (Family Medicine)    Outpatient Medications Prior to Visit   Medication Sig Dispense Refill   • Calcium Carbonate-Vit D-Min (CALCIUM 1200 PO) Take  by mouth Daily.     • dimenhyDRINATE (DRAMAMINE PO) Take  by mouth As Needed (vertigo).     • ferrous sulfate (FerrouSul) 325 (65 FE) MG tablet Take 1 tablet by mouth 1 (One) Time Per Week. Take with Orange Juice 100 tablet 0   • fluticasone (FLONASE) 50 MCG/ACT nasal spray 2 sprays into the nostril(s) as directed by provider Daily.     • glucosamine-chondroitin 500-400 MG capsule capsule Take 1 capsule by mouth Daily.     • pantoprazole (PROTONIX) 40 MG EC tablet Take 1 tablet by mouth Daily. 90 tablet 0   • famotidine (Pepcid) 40 MG tablet Take 1 tablet by mouth Every Night. 90 tablet 1   • amoxicillin (AMOXIL) 500 MG capsule Take 1 capsule by mouth 3 (Three) Times a Day until gone. 30 capsule 0     No facility-administered medications prior to visit.       No opioid medication identified on active medication list. I have reviewed chart for other potential  high risk medication/s and harmful drug interactions in the elderly.          Aspirin is not on active medication list.  Aspirin use is not indicated based on review of  "current medical condition/s. Risk of harm outweighs potential benefits.  .    Patient Active Problem List   Diagnosis   • Villarreal's esophagus without dysplasia   • Colon polyp   • Iron deficiency anemia due to chronic blood loss   • Shortness of breath   • Encounter for screening mammogram for malignant neoplasm of breast   • Vaccination hesitancy by patient   • Medicare annual wellness visit, subsequent     Advance Care Planning  Advance Directive is not on file.  ACP discussion was held with the patient during this visit. Patient does not have an advance directive, information provided.     Objective    Vitals:    02/16/23 1357   BP: 126/78   BP Location: Left arm   Patient Position: Sitting   Cuff Size: Adult   Pulse: 69   Temp: 98.1 °F (36.7 °C)   SpO2: 95%   Weight: 70.4 kg (155 lb 2 oz)   Height: 172.7 cm (67.99\")     Estimated body mass index is 23.59 kg/m² as calculated from the following:    Height as of this encounter: 172.7 cm (67.99\").    Weight as of this encounter: 70.4 kg (155 lb 2 oz).    BMI is within normal parameters. No other follow-up for BMI required.      Does the patient have evidence of cognitive impairment? No          HEALTH RISK ASSESSMENT    Smoking Status:  Social History     Tobacco Use   Smoking Status Never   Smokeless Tobacco Never     Alcohol Consumption:  Social History     Substance and Sexual Activity   Alcohol Use Never     Fall Risk Screen:    STEADI Fall Risk Assessment was completed, and patient is at LOW risk for falls.Assessment completed on:2/16/2023    Depression Screening:  PHQ-2/PHQ-9 Depression Screening 2/16/2023   Little Interest or Pleasure in Doing Things 0-->not at all   Feeling Down, Depressed or Hopeless 0-->not at all   PHQ-9: Brief Depression Severity Measure Score 0       Health Habits and Functional and Cognitive Screening:  Functional & Cognitive Status 2/16/2023   Do you have difficulty preparing food and eating? No   Do you have difficulty bathing " yourself, getting dressed or grooming yourself? No   Do you have difficulty using the toilet? No   Do you have difficulty moving around from place to place? No   Do you have trouble with steps or getting out of a bed or a chair? No   Current Diet Limited Junk Food   Dental Exam Up to date   Eye Exam Up to date   Exercise (times per week) 5 times per week   Current Exercises Include Walking   Do you need help using the phone?  No   Are you deaf or do you have serious difficulty hearing?  No   Do you need help with transportation? No   Do you need help shopping? No   Do you need help preparing meals?  No   Do you need help with housework?  No   Do you need help with laundry? No   Do you need help taking your medications? No   Do you need help managing money? No   Do you ever drive or ride in a car without wearing a seat belt? No   Have you felt unusual stress, anger or loneliness in the last month? No   Who do you live with? Spouse   If you need help, do you have trouble finding someone available to you? No   Have you been bothered in the last four weeks by sexual problems? No   Do you have difficulty concentrating, remembering or making decisions? No       Age-appropriate Screening Schedule:  Refer to the list below for future screening recommendations based on patient's age, sex and/or medical conditions. Orders for these recommended tests are listed in the plan section. The patient has been provided with a written plan.    Health Maintenance   Topic Date Due   • ZOSTER VACCINE (1 of 2) Never done   • INFLUENZA VACCINE  Never done   • MAMMOGRAM  02/28/2024   • DXA SCAN  02/28/2024   • TDAP/TD VACCINES (2 - Td or Tdap) 11/03/2027                CMS Preventative Services Quick Reference  Risk Factors Identified During Encounter  None Identified  The above risks/problems have been discussed with the patient.  Pertinent information has been shared with the patient in the After Visit Summary.  An After Visit Summary and  "PPPS were made available to the patient.    Follow Up:   Next Medicare Wellness visit to be scheduled in 1 year.       Additional E&M Note during same encounter follows:  Patient has multiple medical problems which are significant and separately identifiable that require additional work above and beyond the Medicare Wellness Visit.      Chief Complaint  Medicare Wellness-Initial Visit    Subjective        HPI  Hortencia Parisi is also being seen today for other health issues as noted below      She does have a history of colon polyps and Villarreal's esophagus.  She gets EGD and colonoscopy  with Dr. Wesley. In the process of getting scheduled of the EGD,  and colonoscopy is due next year. She says the addition of Pepcid to Protonix has really helped.     She has had iron deficiency and borderline anemic.    She does remain active and is on a regular exercise routine through  [employer] 2-3 times a week and walk 2-3 day a week        Objective   Vital Signs:  /78 (BP Location: Left arm, Patient Position: Sitting, Cuff Size: Adult)   Pulse 69   Temp 98.1 °F (36.7 °C)   Ht 172.7 cm (67.99\")   Wt 70.4 kg (155 lb 2 oz)   SpO2 95%   BMI 23.59 kg/m²     Physical Exam  Constitutional:       Appearance: Normal appearance.   Neck:      Vascular: No carotid bruit.   Cardiovascular:      Rate and Rhythm: Normal rate and regular rhythm.      Heart sounds: Normal heart sounds. No murmur heard.  Pulmonary:      Effort: No respiratory distress.      Breath sounds: No wheezing or rales.   Musculoskeletal:      Right lower leg: No edema.      Left lower leg: No edema.   Lymphadenopathy:      Cervical: No cervical adenopathy.   Neurological:      General: No focal deficit present.      Mental Status: She is alert.   Psychiatric:         Attention and Perception: Attention normal.         Mood and Affect: Mood normal.         Speech: Speech normal.                         Assessment and Plan   Diagnoses and all orders for " this visit:    1. Medicare annual wellness visit, subsequent (Primary)  Assessment & Plan:  We reviewed the preventive service recommendations and created an individualized handout      2. Villarreal's esophagus without dysplasia  Assessment & Plan:  Has had good results with the addition of the Pepcid.  She is scheduled for EGD.  Continue to follow with gastroenterology as recommended    Orders:  -     Comprehensive Metabolic Panel  -     famotidine (Pepcid) 40 MG tablet; Take 1 tablet by mouth Every Night.  Dispense: 90 tablet; Refill: 1    3. Vaccination hesitancy by patient  Assessment & Plan:  Not interested in the flu shot or COVID vaccination.  Did have covid x 2 and did fine.  I did inform her that availability of therapeutics for treatment of COVID but the necessity to test early and get initiated on treatment within 5 days.  Also informed her she is eligible for the shingles vaccines which are now covered by Medicare to pharmacy      4. Iron deficiency anemia due to chronic blood loss  Comments:  We will get repeat labs for follow-up  Orders:  -     Iron Profile  -     Ferritin  -     CBC & Differential  -     Reticulocytes  -     Vitamin B12  -     Folate           Follow Up   No follow-ups on file.  Patient was given instructions and counseling regarding her condition or for health maintenance advice. Please see specific information pulled into the AVS if appropriate.

## 2023-02-19 NOTE — ASSESSMENT & PLAN NOTE
Has had good results with the addition of the Pepcid.  She is scheduled for EGD.  Continue to follow with gastroenterology as recommended

## 2023-03-24 ENCOUNTER — HOSPITAL ENCOUNTER (OUTPATIENT)
Dept: MAMMOGRAPHY | Facility: HOSPITAL | Age: 73
Discharge: HOME | End: 2023-03-24
Admitting: FAMILY MEDICINE
Payer: MEDICARE

## 2023-03-24 DIAGNOSIS — Z12.31 VISIT FOR SCREENING MAMMOGRAM: ICD-10-CM

## 2023-03-24 PROCEDURE — 77063 BREAST TOMOSYNTHESIS BI: CPT

## 2023-03-24 PROCEDURE — 77067 SCR MAMMO BI INCL CAD: CPT

## 2023-04-04 VITALS
SYSTOLIC BLOOD PRESSURE: 156 MMHG | OXYGEN SATURATION: 97 % | SYSTOLIC BLOOD PRESSURE: 145 MMHG | RESPIRATION RATE: 16 BRPM | OXYGEN SATURATION: 99 % | HEART RATE: 64 BPM | HEART RATE: 91 BPM | OXYGEN SATURATION: 100 % | SYSTOLIC BLOOD PRESSURE: 147 MMHG | OXYGEN SATURATION: 96 % | SYSTOLIC BLOOD PRESSURE: 143 MMHG | HEART RATE: 87 BPM | TEMPERATURE: 97.1 F | WEIGHT: 148 LBS | HEART RATE: 67 BPM | RESPIRATION RATE: 15 BRPM | HEART RATE: 69 BPM | DIASTOLIC BLOOD PRESSURE: 92 MMHG | RESPIRATION RATE: 14 BRPM | DIASTOLIC BLOOD PRESSURE: 79 MMHG | DIASTOLIC BLOOD PRESSURE: 89 MMHG | DIASTOLIC BLOOD PRESSURE: 87 MMHG | SYSTOLIC BLOOD PRESSURE: 153 MMHG | OXYGEN SATURATION: 98 % | DIASTOLIC BLOOD PRESSURE: 100 MMHG | DIASTOLIC BLOOD PRESSURE: 85 MMHG | HEART RATE: 77 BPM | DIASTOLIC BLOOD PRESSURE: 90 MMHG | HEIGHT: 69 IN | RESPIRATION RATE: 13 BRPM | TEMPERATURE: 97.2 F

## 2023-04-05 ENCOUNTER — AMBULATORY SURGICAL CENTER (AMBULATORY)
Dept: URBAN - METROPOLITAN AREA SURGERY 17 | Facility: SURGERY | Age: 73
End: 2023-04-05
Payer: COMMERCIAL

## 2023-04-05 ENCOUNTER — OFFICE (AMBULATORY)
Dept: URBAN - METROPOLITAN AREA PATHOLOGY 4 | Facility: PATHOLOGY | Age: 73
End: 2023-04-05

## 2023-04-05 DIAGNOSIS — K22.70 BARRETT'S ESOPHAGUS WITHOUT DYSPLASIA: ICD-10-CM

## 2023-04-05 DIAGNOSIS — K31.7 POLYP OF STOMACH AND DUODENUM: ICD-10-CM

## 2023-04-05 DIAGNOSIS — K22.89 OTHER SPECIFIED DISEASE OF ESOPHAGUS: ICD-10-CM

## 2023-04-05 LAB
GI HISTOLOGY: A. UNSPECIFIED: (no result)
GI HISTOLOGY: B. UNSPECIFIED: (no result)
GI HISTOLOGY: PDF REPORT: (no result)

## 2023-04-05 PROCEDURE — 43239 EGD BIOPSY SINGLE/MULTIPLE: CPT | Performed by: INTERNAL MEDICINE

## 2023-04-05 PROCEDURE — 88305 TISSUE EXAM BY PATHOLOGIST: CPT | Performed by: INTERNAL MEDICINE

## 2023-04-12 DIAGNOSIS — K22.70 BARRETT'S ESOPHAGUS WITHOUT DYSPLASIA: ICD-10-CM

## 2023-04-12 RX ORDER — PANTOPRAZOLE SODIUM 40 MG/1
40 TABLET, DELAYED RELEASE ORAL DAILY
Qty: 90 TABLET | Refills: 0 | Status: SHIPPED | OUTPATIENT
Start: 2023-04-12

## 2023-05-22 DIAGNOSIS — K22.70 BARRETT'S ESOPHAGUS WITHOUT DYSPLASIA: ICD-10-CM

## 2023-05-22 RX ORDER — FAMOTIDINE 40 MG/1
40 TABLET, FILM COATED ORAL NIGHTLY
Qty: 5 TABLET | Refills: 0 | Status: SHIPPED | OUTPATIENT
Start: 2023-05-22

## 2023-05-22 RX ORDER — PANTOPRAZOLE SODIUM 40 MG/1
40 TABLET, DELAYED RELEASE ORAL DAILY
Qty: 5 TABLET | Refills: 0 | Status: SHIPPED | OUTPATIENT
Start: 2023-05-22

## 2023-07-13 PROBLEM — R51.9 HEADACHE: Status: ACTIVE | Noted: 2023-07-13

## 2023-08-16 ENCOUNTER — TELEPHONE (OUTPATIENT)
Dept: FAMILY MEDICINE CLINIC | Age: 73
End: 2023-08-16

## 2023-08-16 DIAGNOSIS — K22.70 BARRETT'S ESOPHAGUS WITHOUT DYSPLASIA: ICD-10-CM

## 2023-08-16 NOTE — TELEPHONE ENCOUNTER
Caller: Hortencia Parisi    Relationship: Self    Best call back number: [phone]    Requested Prescriptions:     famotidine (Pepcid) 40 MG tablet   Take 1 tablet by mouth Every Night     PATIENT REQUESTS A 90 DAY QTY PLEASE       Pharmacy where request should be sent:  [facility] [phone]     Last office visit with prescribing clinician: 7/13/2023   Last telemedicine visit with prescribing clinician: Visit date not found   Next office visit with prescribing clinician: 2/27/2024     Additional details provided by patient: PATIENT REQUESTS A 90 DAY QUANTITY    Does the patient have less than a 3 day supply:  [] Yes  [x] No    Would you like a call back once the refill request has been completed: [] Yes [x] No    If the office needs to give you a call back, can they leave a voicemail: [] Yes [x] No    Donis Barcenas Rep   08/16/23 10:18 EDT

## 2023-08-18 RX ORDER — FAMOTIDINE 40 MG/1
40 TABLET, FILM COATED ORAL NIGHTLY
Qty: 90 TABLET | Refills: 1 | Status: SHIPPED | OUTPATIENT
Start: 2023-08-18

## 2023-10-30 ENCOUNTER — OFFICE VISIT (OUTPATIENT)
Dept: FAMILY MEDICINE CLINIC | Age: 73
End: 2023-10-30
Payer: MEDICARE

## 2023-10-30 VITALS
WEIGHT: 149 LBS | TEMPERATURE: 97.1 F | SYSTOLIC BLOOD PRESSURE: 133 MMHG | HEART RATE: 70 BPM | DIASTOLIC BLOOD PRESSURE: 88 MMHG | OXYGEN SATURATION: 97 % | BODY MASS INDEX: 22.58 KG/M2 | HEIGHT: 68 IN

## 2023-10-30 DIAGNOSIS — R05.1 ACUTE COUGH: Primary | ICD-10-CM

## 2023-10-30 LAB
EXPIRATION DATE: NORMAL
FLUAV AG UPPER RESP QL IA.RAPID: NOT DETECTED
FLUBV AG UPPER RESP QL IA.RAPID: NOT DETECTED
INTERNAL CONTROL: NORMAL
Lab: NORMAL
SARS-COV-2 AG UPPER RESP QL IA.RAPID: NOT DETECTED

## 2023-10-30 PROCEDURE — 87428 SARSCOV & INF VIR A&B AG IA: CPT

## 2023-10-30 PROCEDURE — 1159F MED LIST DOCD IN RCRD: CPT

## 2023-10-30 PROCEDURE — 1160F RVW MEDS BY RX/DR IN RCRD: CPT

## 2023-10-30 PROCEDURE — 99213 OFFICE O/P EST LOW 20 MIN: CPT

## 2023-10-30 NOTE — PROGRESS NOTES
Subjective     CHIEF COMPLAINT    Chief Complaint   Patient presents with    Cough     Lingering cough, chest congestion x 2 days     History of Present Illness  Patient is a 73-year-old female, presents to the clinic today with complaints of cough, congestion, rhinorrhea and chest tightness.  Denies any shortness of breath, chest pain or wheezing.  She states her symptoms have been present for about 2 days.  She just would like somebody to listen to her lungs to make sure they sound okay.  She does have a history of Villarreal's esophagus and will get some chest discomfort at times with this.  Cough is dry nonproductive.  Runny nose is intermittent.  She denies any known exposures any illnesses.  She has seasonal allergies for which she takes Flonase daily and Benadryl as needed.  He has not had any fevers or chills.    Review of Systems   Constitutional:  Negative for chills and fever.   HENT:  Positive for congestion and rhinorrhea. Negative for sore throat.    Respiratory:  Positive for cough and chest tightness. Negative for shortness of breath and wheezing.    Cardiovascular:  Negative for chest pain.   Gastrointestinal:  Negative for nausea and vomiting.   Musculoskeletal:  Negative for myalgias.   Neurological:  Negative for headaches.     Allergies   Allergen Reactions    Prednisone Nausea Only and Dizziness       Current Outpatient Medications on File Prior to Visit   Medication Sig Dispense Refill    Calcium Carbonate-Vit D-Min (CALCIUM 1200 PO) Take  by mouth Daily.      dimenhyDRINATE (DRAMAMINE PO) Take  by mouth As Needed (vertigo).      famotidine (Pepcid) 40 MG tablet Take 1 tablet by mouth Every Night. 90 tablet 1    ferrous sulfate (FerrouSul) 325 (65 FE) MG tablet Take 1 tablet by mouth 1 (One) Time Per Week. Take with [place] Juice (Patient taking differently: Take 1 tablet by mouth 1 (One) Time Per Week. 3 times a week) 100 tablet 0    fluticasone (FLONASE) 50 MCG/ACT nasal spray 2 sprays into the  "nostril(s) as directed by provider Daily.      glucosamine-chondroitin 500-400 MG capsule capsule Take 1 capsule by mouth Daily.      pantoprazole (PROTONIX) 40 MG EC tablet Take 1 tablet by mouth Daily. 90 tablet 1     No current facility-administered medications on file prior to visit.     /88 (BP Location: Right arm, Patient Position: Sitting, Cuff Size: Adult)   Pulse 70   Temp 97.1 °F (36.2 °C) (Oral)   Ht 172.7 cm (67.99\")   Wt 67.6 kg (149 lb)   SpO2 97%   BMI 22.66 kg/m²     Objective     Physical Exam  Vitals and nursing note reviewed.   Constitutional:       General: She is not in acute distress.     Appearance: Normal appearance. She is not ill-appearing.   HENT:      Head: Normocephalic.      Right Ear: Tympanic membrane, ear canal and external ear normal.      Left Ear: Tympanic membrane, ear canal and external ear normal.      Nose: Nose normal.      Right Sinus: No maxillary sinus tenderness or frontal sinus tenderness.      Left Sinus: No maxillary sinus tenderness or frontal sinus tenderness.      Mouth/Throat:      Lips: Pink.      Mouth: Mucous membranes are moist.      Pharynx: Oropharynx is clear. Uvula midline. No pharyngeal swelling, oropharyngeal exudate, posterior oropharyngeal erythema or uvula swelling.   Eyes:      Pupils: Pupils are equal, round, and reactive to light.   Cardiovascular:      Rate and Rhythm: Normal rate and regular rhythm.      Heart sounds: Normal heart sounds. No murmur heard.  Pulmonary:      Effort: Pulmonary effort is normal. No accessory muscle usage or respiratory distress.      Breath sounds: Normal breath sounds. No wheezing or rhonchi.   Musculoskeletal:      Cervical back: Normal range of motion.   Lymphadenopathy:      Cervical: No cervical adenopathy.   Skin:     General: Skin is warm and dry.   Neurological:      General: No focal deficit present.      Mental Status: She is alert and oriented to person, place, and time.   Psychiatric:         " Mood and Affect: Mood and affect normal.         Behavior: Behavior normal.          Lab Results (last 24 hours)       Procedure Component Value Units Date/Time    POCT SARS-CoV-2 Antigen JAIMIE + Flu [[id number]] Collected: 10/30/23 1434    Specimen: Swab Updated: 10/30/23 1436     SARS Antigen Not Detected     Influenza A Antigen JAIMIE Not Detected     Influenza B Antigen JAIMIE Not Detected     Internal Control Passed     Lot Number [id number]     Expiration Date 8/7/24             Diagnoses and all orders for this visit:    1. Acute cough (Primary)  -     POCT SARS-CoV-2 Antigen JAIMIE + Flu      Patient is negative for COVID and flu on rapid testing today.  Lungs sound clear on exam there is no evidence of bacterial infection at this time.  I did offer patient a chest x-ray since she is concerned for pneumonia but she declined.  Declines any prescription cough medication.  Symptoms are likely viral, recommend symptomatic treatment.  Increase fluid intake and rest.  Return to clinic if symptoms worsen or do not improve.  ER precautions advised.       Follow-up:  Return if symptoms worsen or fail to improve.  Patient was given instructions and counseling regarding her condition or for health maintenance advice. Please see specific information pulled into the AVS if appropriate.

## 2024-01-08 DIAGNOSIS — K22.70 BARRETT'S ESOPHAGUS WITHOUT DYSPLASIA: ICD-10-CM

## 2024-01-08 RX ORDER — PANTOPRAZOLE SODIUM 40 MG/1
40 TABLET, DELAYED RELEASE ORAL DAILY
Qty: 90 TABLET | Refills: 1 | Status: SHIPPED | OUTPATIENT
Start: 2024-01-08

## 2024-02-15 DIAGNOSIS — K22.70 BARRETT'S ESOPHAGUS WITHOUT DYSPLASIA: ICD-10-CM

## 2024-02-15 RX ORDER — FAMOTIDINE 40 MG/1
40 TABLET, FILM COATED ORAL NIGHTLY
Qty: 90 TABLET | Refills: 1 | Status: SHIPPED | OUTPATIENT
Start: 2024-02-15

## 2024-02-27 ENCOUNTER — LAB (OUTPATIENT)
Dept: LAB | Facility: HOSPITAL | Age: 74
End: 2024-02-27
Payer: MEDICARE

## 2024-02-27 ENCOUNTER — OFFICE VISIT (OUTPATIENT)
Dept: FAMILY MEDICINE CLINIC | Age: 74
End: 2024-02-27
Payer: MEDICARE

## 2024-02-27 VITALS
SYSTOLIC BLOOD PRESSURE: 123 MMHG | BODY MASS INDEX: 22.52 KG/M2 | DIASTOLIC BLOOD PRESSURE: 78 MMHG | WEIGHT: 148.6 LBS | HEART RATE: 74 BPM | HEIGHT: 68 IN | TEMPERATURE: 98 F

## 2024-02-27 DIAGNOSIS — K22.70 BARRETT'S ESOPHAGUS WITHOUT DYSPLASIA: ICD-10-CM

## 2024-02-27 DIAGNOSIS — D50.0 IRON DEFICIENCY ANEMIA DUE TO CHRONIC BLOOD LOSS: ICD-10-CM

## 2024-02-27 DIAGNOSIS — Z78.0 POSTMENOPAUSAL STATE: ICD-10-CM

## 2024-02-27 DIAGNOSIS — D12.2 ADENOMATOUS POLYP OF ASCENDING COLON: ICD-10-CM

## 2024-02-27 DIAGNOSIS — Z00.00 MEDICARE ANNUAL WELLNESS VISIT, SUBSEQUENT: Primary | ICD-10-CM

## 2024-02-27 DIAGNOSIS — Z13.6 SCREENING FOR CARDIOVASCULAR CONDITION: ICD-10-CM

## 2024-02-27 DIAGNOSIS — H91.90 HEARING LOSS, UNSPECIFIED HEARING LOSS TYPE, UNSPECIFIED LATERALITY: ICD-10-CM

## 2024-02-27 LAB
ALBUMIN SERPL-MCNC: 4.8 G/DL (ref 3.5–5.2)
ALBUMIN/GLOB SERPL: 1.8 G/DL
ALP SERPL-CCNC: 51 U/L (ref 39–117)
ALT SERPL W P-5'-P-CCNC: 12 U/L (ref 1–33)
ANION GAP SERPL CALCULATED.3IONS-SCNC: 9 MMOL/L (ref 5–15)
AST SERPL-CCNC: 21 U/L (ref 1–32)
BASOPHILS # BLD AUTO: 0.03 10*3/MM3 (ref 0–0.2)
BASOPHILS NFR BLD AUTO: 0.7 % (ref 0–1.5)
BILIRUB SERPL-MCNC: 0.4 MG/DL (ref 0–1.2)
BUN SERPL-MCNC: 14 MG/DL (ref 8–23)
BUN/CREAT SERPL: 14.6 (ref 7–25)
CALCIUM SPEC-SCNC: 10 MG/DL (ref 8.6–10.5)
CHLORIDE SERPL-SCNC: 103 MMOL/L (ref 98–107)
CHOLEST SERPL-MCNC: 272 MG/DL (ref 0–200)
CO2 SERPL-SCNC: 27 MMOL/L (ref 22–29)
CREAT SERPL-MCNC: 0.96 MG/DL (ref 0.57–1)
DEPRECATED RDW RBC AUTO: 45.7 FL (ref 37–54)
EGFRCR SERPLBLD CKD-EPI 2021: 63.4 ML/MIN/1.73
EOSINOPHIL # BLD AUTO: 0.12 10*3/MM3 (ref 0–0.4)
EOSINOPHIL NFR BLD AUTO: 3 % (ref 0.3–6.2)
ERYTHROCYTE [DISTWIDTH] IN BLOOD BY AUTOMATED COUNT: 13.7 % (ref 12.3–15.4)
FERRITIN SERPL-MCNC: 74.6 NG/ML (ref 13–150)
GLOBULIN UR ELPH-MCNC: 2.7 GM/DL
GLUCOSE SERPL-MCNC: 99 MG/DL (ref 65–99)
HCT VFR BLD AUTO: 36.4 % (ref 34–46.6)
HDLC SERPL-MCNC: 91 MG/DL (ref 40–60)
HGB BLD-MCNC: 11.8 G/DL (ref 12–15.9)
IMM GRANULOCYTES # BLD AUTO: 0 10*3/MM3 (ref 0–0.05)
IMM GRANULOCYTES NFR BLD AUTO: 0 % (ref 0–0.5)
IRON 24H UR-MRATE: 59 MCG/DL (ref 37–145)
IRON SATN MFR SERPL: 16 % (ref 20–50)
LDLC SERPL CALC-MCNC: 172 MG/DL (ref 0–100)
LDLC/HDLC SERPL: 1.85 {RATIO}
LYMPHOCYTES # BLD AUTO: 1.33 10*3/MM3 (ref 0.7–3.1)
LYMPHOCYTES NFR BLD AUTO: 32.9 % (ref 19.6–45.3)
MCH RBC QN AUTO: 29.1 PG (ref 26.6–33)
MCHC RBC AUTO-ENTMCNC: 32.4 G/DL (ref 31.5–35.7)
MCV RBC AUTO: 89.9 FL (ref 79–97)
MONOCYTES # BLD AUTO: 0.27 10*3/MM3 (ref 0.1–0.9)
MONOCYTES NFR BLD AUTO: 6.7 % (ref 5–12)
NEUTROPHILS NFR BLD AUTO: 2.29 10*3/MM3 (ref 1.7–7)
NEUTROPHILS NFR BLD AUTO: 56.7 % (ref 42.7–76)
PLATELET # BLD AUTO: 246 10*3/MM3 (ref 140–450)
PMV BLD AUTO: 8.9 FL (ref 6–12)
POTASSIUM SERPL-SCNC: 4.2 MMOL/L (ref 3.5–5.2)
PROT SERPL-MCNC: 7.5 G/DL (ref 6–8.5)
RBC # BLD AUTO: 4.05 10*6/MM3 (ref 3.77–5.28)
RETICS # AUTO: 0.05 10*6/MM3 (ref 0.02–0.13)
RETICS/RBC NFR AUTO: 1.3 % (ref 0.7–1.9)
SODIUM SERPL-SCNC: 139 MMOL/L (ref 136–145)
TIBC SERPL-MCNC: 358 MCG/DL (ref 298–536)
TRANSFERRIN SERPL-MCNC: 240 MG/DL (ref 200–360)
TRIGL SERPL-MCNC: 62 MG/DL (ref 0–150)
VLDLC SERPL-MCNC: 9 MG/DL (ref 5–40)
WBC NRBC COR # BLD AUTO: 4.04 10*3/MM3 (ref 3.4–10.8)

## 2024-02-27 PROCEDURE — 85045 AUTOMATED RETICULOCYTE COUNT: CPT | Performed by: FAMILY MEDICINE

## 2024-02-27 PROCEDURE — 80053 COMPREHEN METABOLIC PANEL: CPT | Performed by: FAMILY MEDICINE

## 2024-02-27 PROCEDURE — 36415 COLL VENOUS BLD VENIPUNCTURE: CPT | Performed by: FAMILY MEDICINE

## 2024-02-27 PROCEDURE — 83540 ASSAY OF IRON: CPT | Performed by: FAMILY MEDICINE

## 2024-02-27 PROCEDURE — 80061 LIPID PANEL: CPT | Performed by: FAMILY MEDICINE

## 2024-02-27 PROCEDURE — 85025 COMPLETE CBC W/AUTO DIFF WBC: CPT | Performed by: FAMILY MEDICINE

## 2024-02-27 PROCEDURE — 82728 ASSAY OF FERRITIN: CPT | Performed by: FAMILY MEDICINE

## 2024-02-27 PROCEDURE — 84466 ASSAY OF TRANSFERRIN: CPT | Performed by: FAMILY MEDICINE

## 2024-02-27 NOTE — PROGRESS NOTES
The ABCs of the Annual Wellness Visit  Subsequent Medicare Wellness Visit    Subjective    Hortencia Parisi is a 73 y.o. female who presents for a Subsequent Medicare Wellness Visit.    The following portions of the patient's history were reviewed and   updated as appropriate: allergies, current medications, past family history, past medical history, past social history, past surgical history, and problem list.    Compared to one year ago, the patient feels her physical   health is the same.    Compared to one year ago, the patient feels her mental   health is better.    Recent Hospitalizations:  She was not admitted to the hospital during the last year.       Current Medical Providers:  Patient Care Team:  Jarrett Marrero MD as PCP - General (Family Medicine)    Outpatient Medications Prior to Visit   Medication Sig Dispense Refill   • Calcium Carbonate-Vit D-Min (CALCIUM 1200 PO) Take  by mouth Daily.     • dimenhyDRINATE (DRAMAMINE PO) Take  by mouth As Needed (vertigo).     • famotidine (Pepcid) 40 MG tablet Take 1 tablet by mouth Every Night. 90 tablet 1   • fluticasone (FLONASE) 50 MCG/ACT nasal spray 2 sprays into the nostril(s) as directed by provider Daily.     • glucosamine-chondroitin 500-400 MG capsule capsule Take 1 capsule by mouth Daily.     • pantoprazole (PROTONIX) 40 MG EC tablet Take 1 tablet by mouth Daily. 90 tablet 1   • ferrous sulfate (FerrouSul) 325 (65 FE) MG tablet Take 1 tablet by mouth 1 (One) Time Per Week. Take with [place] Juice (Patient taking differently: Take 1 tablet by mouth 1 (One) Time Per Week. 3 times a week) 100 tablet 0   • amoxicillin (AMOXIL) 500 MG capsule Take 1 capsule by mouth 3 times a day until gone 21 capsule 0     No facility-administered medications prior to visit.       No opioid medication identified on active medication list. I have reviewed chart for other potential  high risk medication/s and harmful drug interactions in the elderly.        Aspirin is  "not on active medication list.  Aspirin use is not indicated based on review of current medical condition/s. Risk of harm outweighs potential benefits.  .    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  ACP discussion was held with the patient during this visit. Patient does not have an advance directive, information provided.     Objective    Vitals:    24 1045   BP: 123/78   BP Location: Right arm   Patient Position: Sitting   Pulse: 74   Temp: 98 °F (36.7 °C)   TempSrc: Oral   Weight: 67.4 kg (148 lb 9.6 oz)   Height: 172.7 cm (67.99\")     Estimated body mass index is 22.6 kg/m² as calculated from the following:    Height as of this encounter: 172.7 cm (67.99\").    Weight as of this encounter: 67.4 kg (148 lb 9.6 oz).    BMI is within normal parameters. No other follow-up for BMI required.      Does the patient have evidence of cognitive impairment? No    Lab Results   Component Value Date    TRIG 62 2024    HDL 91 (H) 2024     (H) 2024    VLDL 9 2024        HEALTH RISK ASSESSMENT    Smoking Status:  Social History     Tobacco Use   Smoking Status Never   Smokeless Tobacco Never     Alcohol Consumption:  Social History     Substance and Sexual Activity   Alcohol Use Never     Fall Risk Screen:    STEADI Fall Risk Assessment was completed, and patient is at LOW risk for falls.Assessment completed on:2024    Depression Screenin/27/2024    10:44 AM   PHQ-2/PHQ-9 Depression Screening   Little Interest or Pleasure in Doing Things 0-->not at all   Feeling Down, Depressed or Hopeless " 0-->not at all   PHQ-9: Brief Depression Severity Measure Score 0       Health Habits and Functional and Cognitive Screenin/27/2024    10:44 AM   Functional & Cognitive Status   Do you have difficulty preparing food and eating? No   Do you have difficulty bathing yourself, getting dressed or grooming yourself? No   Do you have difficulty using the toilet? No   Do you have difficulty moving around from place to place? No   Do you have trouble with steps or getting out of a bed or a chair? No   Current Diet Well Balanced Diet   Dental Exam Up to date   Eye Exam Up to date   Exercise (times per week) 4 times per week   Current Exercises Include Walking   Do you need help using the phone?  No   Are you deaf or do you have serious difficulty hearing?  No   Do you need help to go to places out of walking distance? No   Do you need help shopping? No   Do you need help preparing meals?  No   Do you need help with housework?  No   Do you need help with laundry? No   Do you need help taking your medications? No   Do you need help managing money? No   Do you ever drive or ride in a car without wearing a seat belt? No   Have you felt unusual stress, anger or loneliness in the last month? No   Who do you live with? Spouse   If you need help, do you have trouble finding someone available to you? No   Have you been bothered in the last four weeks by sexual problems? No   Do you have difficulty concentrating, remembering or making decisions? No       Age-appropriate Screening Schedule:  Refer to the list below for future screening recommendations based on patient's age, sex and/or medical conditions. Orders for these recommended tests are listed in the plan section. The patient has been provided with a written plan.    Health Maintenance   Topic Date Due   • COVID-19 Vaccine (1) Never done   • RSV Vaccine - Adults (1 - 1-dose 60+ series) Never done   • INFLUENZA VACCINE  Never done   • DXA SCAN  2024   • COLORECTAL  "CANCER SCREENING  04/29/2024   • ANNUAL WELLNESS VISIT  02/27/2025   • MAMMOGRAM  03/24/2025   • TDAP/TD VACCINES (2 - Td or Tdap) 11/03/2027   • HEPATITIS C SCREENING  Completed   • Pneumococcal Vaccine 65+  Completed   • ZOSTER VACCINE  Completed                  CMS Preventative Services Quick Reference  Risk Factors Identified During Encounter  None Identified  The above risks/problems have been discussed with the patient.  Pertinent information has been shared with the patient in the After Visit Summary.  An After Visit Summary and PPPS were made available to the patient.    Follow Up:   Next Medicare Wellness visit to be scheduled in 1 year.       Additional E&M Note during same encounter follows:  Patient has multiple medical problems which are significant and separately identifiable that require additional work above and beyond the Medicare Wellness Visit.      Chief Complaint  Medicare Wellness-subsequent    Subjective        HPI  Hortencia Parisi is also being seen today for other health issues as noted below    Generally she is of good health.  She does have a history of Villarreal's esophagus.  Had EGD with biopsies in April of last year.  It was recommended to repeat endoscopy in 3 years.  Currently doing well on her regimen of Protonix and Pepcid.    he has been able to increase her iron supplementation to 3 times a week.  Will get labs today to follow-up on her anemia    Her colonoscopy 2021 was advised repeat scope in 3 years, so she is due for repeat this year.  She is already in the process of getting that scheduled.    Has noted increasing issues with her hearing and would like to be referred to audiology.       Objective   Vital Signs:  /78 (BP Location: Right arm, Patient Position: Sitting)   Pulse 74   Temp 98 °F (36.7 °C) (Oral)   Ht 172.7 cm (67.99\")   Wt 67.4 kg (148 lb 9.6 oz)   BMI 22.60 kg/m²     Physical Exam  Constitutional:       Appearance: Normal appearance.   Neck:      " Vascular: No carotid bruit.   Cardiovascular:      Rate and Rhythm: Normal rate and regular rhythm.      Heart sounds: Normal heart sounds. No murmur heard.  Pulmonary:      Effort: No respiratory distress.      Breath sounds: No wheezing or rales.   Musculoskeletal:      Right lower leg: No edema.      Left lower leg: No edema.   Lymphadenopathy:      Cervical: No cervical adenopathy.   Neurological:      General: No focal deficit present.      Mental Status: She is alert.   Psychiatric:         Attention and Perception: Attention normal.         Mood and Affect: Mood normal.         Speech: Speech normal.                         Assessment and Plan   Diagnoses and all orders for this visit:    1. Medicare annual wellness visit, subsequent (Primary)  Assessment & Plan:  We reviewed the preventive service recommendations and created an individualized handout      2. 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 3 times a week  Dispense: 1 tablet; Refill: 0    3. Hearing loss, unspecified hearing loss type, unspecified laterality  -     Ambulatory Referral to Audiology    4. Postmenopausal state  -     DEXA Bone Density Axial    5. Adenomatous polyp of ascending colon  Assessment & Plan:  She is in the process of scheduling follow-up colonoscopy      6. Iron deficiency anemia due to chronic blood loss  Assessment & Plan:  Will get follow-up lab work today.    Orders:  -     Iron Profile  -     Ferritin  -     CBC & Differential  -     Reticulocytes  -     Comprehensive Metabolic Panel  -     ferrous sulfate (FerrouSul) 325 (65 FE) MG tablet; Take 1 tablet by mouth 3 (Three) Times a Week. 3 times a week  Dispense: 1 tablet; Refill: 0    7. Screening for cardiovascular condition  -     Lipid Panel              Follow Up   No follow-ups on file.  Patient was given instructions and counseling regarding her condition or for health maintenance advice. Please see specific information pulled into the AVS if appropriate.

## 2024-02-27 NOTE — PROGRESS NOTES
"The ABCs of the Annual Wellness Visit  Subsequent Medicare Wellness Visit    Subjective    Hortencia Parisi is a 73 y.o. female who presents for a Subsequent Medicare Wellness Visit.    The following portions of the patient's history were reviewed and   updated as appropriate: allergies, current medications, past family history, past medical history, past social history, past surgical history, and problem list.    Compared to one year ago, the patient feels her physical   health is the same.    Compared to one year ago, the patient feels her mental   health is better.    Recent Hospitalizations:  She was not admitted to the hospital during the last year.       Advance Care Planning  Advance Directive is not on file.  ACP discussion was held with the patient during this visit. Patient does not have an advance directive, information provided.    Does the patient have evidence of cognitive impairment? No    Aspirin is not on active medication list.  Aspirin use is not indicated based on review of current medical condition/s. Risk of harm outweighs potential benefits.  .    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 I have reviewed chart for other potential  high risk medication/s and harmful drug interactions in the elderly.             Objective    Vitals:    02/27/24 1045   BP: 123/78   BP Location: Right arm   Patient Position: Sitting   Pulse: 74   Temp: 98 °F (36.7 °C)   TempSrc: Oral   Weight: 67.4 kg (148 lb 9.6 oz)   Height: 172.7 cm (67.99\") " "    Estimated body mass index is 22.6 kg/m² as calculated from the following:    Height as of this encounter: 172.7 cm (67.99\").    Weight as of this encounter: 67.4 kg (148 lb 9.6 oz).    BMI is within normal parameters. No other follow-up for BMI required.                HEALTH RISK ASSESSMENT    Smoking Status:  Social History     Tobacco Use   Smoking Status Never   Smokeless Tobacco Never     Alcohol Consumption:  Social History     Substance and Sexual Activity   Alcohol Use Never     Fall Risk Screen:    STEADI Fall Risk Assessment was completed, and patient is at LOW risk for falls.Assessment completed on:2024    Depression Screenin/27/2024    10:44 AM   PHQ-2/PHQ-9 Depression Screening   Little Interest or Pleasure in Doing Things 0-->not at all   Feeling Down, Depressed or Hopeless 0-->not at all   PHQ-9: Brief Depression Severity Measure Score 0       Health Habits and Functional and Cognitive Screenin/27/2024    10:44 AM   Functional & Cognitive Status   Do you have difficulty preparing food and eating? No   Do you have difficulty bathing yourself, getting dressed or grooming yourself? No   Do you have difficulty using the toilet? No   Do you have difficulty moving around from place to place? No   Do you have trouble with steps or getting out of a bed or a chair? No   Current Diet Well Balanced Diet   Dental Exam Up to date   Eye Exam Up to date   Exercise (times per week) 4 times per week   Current Exercises Include Walking   Do you need help using the phone?  No   Are you deaf or do you have serious difficulty hearing?  No   Do you need help to go to places out of walking distance? No   Do you need help shopping? No   Do you need help preparing meals?  No   Do you need help with housework?  No   Do you need help with laundry? No   Do you need help taking your medications? No   Do you need help managing money? No   Do you ever drive or ride in a car without wearing a seat belt? No "   Have you felt unusual stress, anger or loneliness in the last month? No   Who do you live with? Spouse   If you need help, do you have trouble finding someone available to you? No   Have you been bothered in the last four weeks by sexual problems? No   Do you have difficulty concentrating, remembering or making decisions? No       Age-appropriate Screening Schedule:  Refer to the list below for future screening recommendations based on patient's age, sex and/or medical conditions. Orders for these recommended tests are listed in the plan section. The patient has been provided with a written plan.    Health Maintenance   Topic Date Due    COVID-19 Vaccine (1) Never done    RSV Vaccine - Adults (1 - 1-dose 60+ series) Never done    INFLUENZA VACCINE  Never done    ANNUAL WELLNESS VISIT  02/16/2024    DXA SCAN  02/28/2024    MAMMOGRAM  03/24/2025    TDAP/TD VACCINES (2 - Td or Tdap) 11/03/2027    COLORECTAL CANCER SCREENING  04/29/2031    HEPATITIS C SCREENING  Completed    Pneumococcal Vaccine 65+  Completed    ZOSTER VACCINE  Completed              Outpatient Medications Prior to Visit   Medication Sig Dispense Refill    Calcium Carbonate-Vit D-Min (CALCIUM 1200 PO) Take  by mouth Daily.      dimenhyDRINATE (DRAMAMINE PO) Take  by mouth As Needed (vertigo).      famotidine (Pepcid) 40 MG tablet Take 1 tablet by mouth Every Night. 90 tablet 1    ferrous sulfate (FerrouSul) 325 (65 FE) MG tablet Take 1 tablet by mouth 1 (One) Time Per Week. Take with [place] Juice (Patient taking differently: Take 1 tablet by mouth 1 (One) Time Per Week. 3 times a week) 100 tablet 0    fluticasone (FLONASE) 50 MCG/ACT nasal spray 2 sprays into the nostril(s) as directed by provider Daily.      glucosamine-chondroitin 500-400 MG capsule capsule Take 1 capsule by mouth Daily.      pantoprazole (PROTONIX) 40 MG EC tablet Take 1 tablet by mouth Daily. 90 tablet 1    amoxicillin (AMOXIL) 500 MG capsule Take 1 capsule by mouth 3 times a day  until gone 21 capsule 0     No facility-administered medications prior to visit.       CMS Preventative Services Quick Reference  Risk Factors Identified During Encounter  None Identified  Hearing Problem: Referral to Audiologist ordered  The above risks/problems have been discussed with the patient.  Pertinent information has been shared with the patient in the After Visit Summary.  An After Visit Summary and PPPS were made available to the patient.    Follow Up:   Next Medicare Wellness visit to be scheduled in 1 year.       Additional E&M Note during same encounter follows:  Patient has multiple medical problems which are significant and separately identifiable that require additional work above and beyond the Medicare Wellness Visit.         Hortencia Parisi presents to [facility].    Chief Complaint:  Hearing loss       Subjective   History of Present Illness:  HPI    Has noticed some hearing loss in the past year. Harder for her to hear grandchildren talk. Also has tinnitus and vertigo. Relieved with dramamine.     GERD/Barretts - takes pepcid and protonix every day without issues. No problems since. No more associated dyspnea.    Iron deficiency - taking iron pills 3 days a week, usually pretty regular but sometimes has constipation so changed to every 3 days. No blood in the stool     HCM  - mammogram normal last year, will repeat next year  - colonoscopy due this year, currently scheduling    - needs repeat DEXA, 2022 evidence of osteopenia, has been taking vitamin D supplements       Result Review   The following data was reviewed by Evelyn Pandya, Medical Student on 02/27/2024.  Lab Results   Component Value Date    WBC 4.27 02/16/2023    HGB 11.0 (L) 02/16/2023    HCT 34.0 02/16/2023    MCV 89.0 02/16/2023     02/16/2023     Lab Results   Component Value Date    GLUCOSE 89 02/16/2023    BUN 11 02/16/2023    CREATININE 0.83 02/16/2023    EGFR 75.9 02/16/2023     "BCR 13.3 02/16/2023    K 4.1 02/16/2023    CO2 28.0 02/16/2023    CALCIUM 9.7 02/16/2023    ALBUMIN 4.7 02/16/2023    BILITOT 0.3 02/16/2023    AST 19 02/16/2023    ALT 9 02/16/2023     Lab Results   Component Value Date    CHOL 217 (H) 02/18/2022    CHLPL 200 04/28/2021    TRIG 88 02/18/2022    HDL 81 (H) 02/18/2022     (H) 02/18/2022     Lab Results   Component Value Date    TSH 2.460 02/18/2022     No results found for: \"HGBA1C\"  No results found for: \"PSA\"  Lab Results   Component Value Date    IRON 40 02/16/2023      No results found for: \"DQBZ24JH\"          Assessment and Plan:     1) Medicare wellness visit   - hearing loss, will refer to audiology   - DEXA scan will order   - mammogram in 2 years   - Annual labs including TSH, iron, CBC, CMP    2) Barretts  - continue using pepsid and protonix   - due for scope in 2 years       Medications:      Current Outpatient Medications:     Calcium Carbonate-Vit D-Min (CALCIUM 1200 PO), Take  by mouth Daily., Disp: , Rfl:     dimenhyDRINATE (DRAMAMINE PO), Take  by mouth As Needed (vertigo)., Disp: , Rfl:     famotidine (Pepcid) 40 MG tablet, Take 1 tablet by mouth Every Night., Disp: 90 tablet, Rfl: 1    ferrous sulfate (FerrouSul) 325 (65 FE) MG tablet, Take 1 tablet by mouth 1 (One) Time Per Week. Take with [place] Juice (Patient taking differently: Take 1 tablet by mouth 1 (One) Time Per Week. 3 times a week), Disp: 100 tablet, Rfl: 0    fluticasone (FLONASE) 50 MCG/ACT nasal spray, 2 sprays into the nostril(s) as directed by provider Daily., Disp: , Rfl:     glucosamine-chondroitin 500-400 MG capsule capsule, Take 1 capsule by mouth Daily., Disp: , Rfl:     pantoprazole (PROTONIX) 40 MG EC tablet, Take 1 tablet by mouth Daily., Disp: 90 tablet, Rfl: 1    amoxicillin (AMOXIL) 500 MG capsule, Take 1 capsule by mouth 3 times a day until gone, Disp: 21 capsule, Rfl: 0       Objective   Vital Signs:   /78 (BP Location: Right arm, Patient Position: Sitting) " "  Pulse 74   Temp 98 °F (36.7 °C) (Oral)   Ht 172.7 cm (67.99\")   Wt 67.4 kg (148 lb 9.6 oz)   BMI 22.60 kg/m²       Physical Exam:  Physical Exam    Normal dentition   No lymphadenopathy   RRR, no murmurs or gallops  Lung sounds clear   No abdominal tenderness to palpation   No lower extremity edema  Ear canals minimal wax, no other blockage       Review of Systems:  Review of Systems   None, see HPI      Follow Up   No follow-ups on file.      Medical History:  Past Medical History:    Allergic    Prednisone    Anemia    Colon polyp    Diverticulosis    GERD (gastroesophageal reflux disease)    Diagnosed with Barretts    Osteopenia    Osteopenia    Pneumonia     Past Surgical History:    CHOLECYSTECTOMY    COLONOSCOPY    TUBAL ABDOMINAL LIGATION      Family History   Problem Relation Age of Onset    Aortic aneurysm Father     Atrial fibrillation Father     Kidney cancer Father     COPD Father     Diverticulosis Sister         rupture Bowel     Social History     Tobacco Use    Smoking status: Never    Smokeless tobacco: Never   Substance Use Topics    Alcohol use: Never       Health Maintenance Due   Topic Date Due    COVID-19 Vaccine (1) Never done    RSV Vaccine - Adults (1 - 1-dose 60+ series) Never done    INFLUENZA VACCINE  Never done    ANNUAL WELLNESS VISIT  02/16/2024        Immunization History   Administered Date(s) Administered    Pneumococcal Conjugate 13-Valent (PCV13) 12/05/2016    Pneumococcal Polysaccharide (PPSV23) 12/22/2017    Shingrix 03/24/2023, 07/13/2023    Tdap 11/03/2017       Allergies   Allergen Reactions    Prednisone Nausea Only and Dizziness      "

## 2024-03-01 RX ORDER — FERROUS SULFATE 325(65) MG
325 TABLET ORAL 3 TIMES WEEKLY
Qty: 1 TABLET | Refills: 0
Start: 2024-03-01

## 2024-03-12 ENCOUNTER — HOSPITAL ENCOUNTER (OUTPATIENT)
Dept: BONE DENSITY | Facility: HOSPITAL | Age: 74
Discharge: HOME | End: 2024-03-12
Admitting: FAMILY MEDICINE
Payer: MEDICARE

## 2024-03-12 PROCEDURE — 77080 DXA BONE DENSITY AXIAL: CPT

## 2024-05-14 VITALS
HEART RATE: 78 BPM | HEART RATE: 75 BPM | DIASTOLIC BLOOD PRESSURE: 59 MMHG | SYSTOLIC BLOOD PRESSURE: 95 MMHG | RESPIRATION RATE: 24 BRPM | SYSTOLIC BLOOD PRESSURE: 148 MMHG | TEMPERATURE: 97.2 F | SYSTOLIC BLOOD PRESSURE: 103 MMHG | HEART RATE: 72 BPM | RESPIRATION RATE: 16 BRPM | RESPIRATION RATE: 17 BRPM | WEIGHT: 145 LBS | SYSTOLIC BLOOD PRESSURE: 137 MMHG | OXYGEN SATURATION: 97 % | DIASTOLIC BLOOD PRESSURE: 65 MMHG | OXYGEN SATURATION: 96 % | OXYGEN SATURATION: 99 % | OXYGEN SATURATION: 98 % | DIASTOLIC BLOOD PRESSURE: 64 MMHG | HEART RATE: 74 BPM | RESPIRATION RATE: 12 BRPM | DIASTOLIC BLOOD PRESSURE: 50 MMHG | DIASTOLIC BLOOD PRESSURE: 61 MMHG | HEIGHT: 69 IN | HEART RATE: 70 BPM | HEART RATE: 69 BPM | DIASTOLIC BLOOD PRESSURE: 85 MMHG | TEMPERATURE: 98.9 F | SYSTOLIC BLOOD PRESSURE: 102 MMHG | RESPIRATION RATE: 10 BRPM | DIASTOLIC BLOOD PRESSURE: 63 MMHG | RESPIRATION RATE: 13 BRPM | DIASTOLIC BLOOD PRESSURE: 77 MMHG | HEART RATE: 84 BPM | SYSTOLIC BLOOD PRESSURE: 101 MMHG | SYSTOLIC BLOOD PRESSURE: 86 MMHG | SYSTOLIC BLOOD PRESSURE: 126 MMHG | DIASTOLIC BLOOD PRESSURE: 80 MMHG | SYSTOLIC BLOOD PRESSURE: 93 MMHG | SYSTOLIC BLOOD PRESSURE: 99 MMHG | OXYGEN SATURATION: 100 % | RESPIRATION RATE: 9 BRPM | DIASTOLIC BLOOD PRESSURE: 76 MMHG | SYSTOLIC BLOOD PRESSURE: 124 MMHG | HEART RATE: 73 BPM | HEART RATE: 77 BPM | RESPIRATION RATE: 19 BRPM

## 2024-05-19 PROBLEM — Z86.010 SURVEILLANCE DUE TO PRIOR COLONIC NEOPLASIA: Status: ACTIVE | Noted: 2024-05-20

## 2024-05-20 ENCOUNTER — TELEPHONE (OUTPATIENT)
Dept: FAMILY MEDICINE CLINIC | Age: 74
End: 2024-05-20
Payer: MEDICARE

## 2024-05-20 ENCOUNTER — AMBULATORY SURGICAL CENTER (AMBULATORY)
Dept: URBAN - METROPOLITAN AREA SURGERY 17 | Facility: SURGERY | Age: 74
End: 2024-05-20
Payer: COMMERCIAL

## 2024-05-20 DIAGNOSIS — Z09 ENCOUNTER FOR FOLLOW-UP EXAMINATION AFTER COMPLETED TREATMEN: ICD-10-CM

## 2024-05-20 DIAGNOSIS — K57.30 DIVERTICULOSIS OF LARGE INTESTINE WITHOUT PERFORATION OR ABS: ICD-10-CM

## 2024-05-20 DIAGNOSIS — Z86.010 PERSONAL HISTORY OF COLONIC POLYPS: ICD-10-CM

## 2024-05-20 PROCEDURE — G0105 COLORECTAL SCRN; HI RISK IND: HCPCS | Performed by: INTERNAL MEDICINE

## 2024-05-20 NOTE — SERVICEHPINOTES
73-year-old female without GI complaints.  She does have a history of reflux and Russell's esophagus.  She is up-to-date in terms of upper endoscopy.  She also has a personal history of polyps and presents for a follow-up colonoscopy.

## 2024-05-20 NOTE — TELEPHONE ENCOUNTER
Patient called stated that Dr. Marrero wanted to recheck her cholesterol levels, there are no labs currently pending. She wants to know if Dr. Marrero can put in orders for lab work. Please call patient if this can be done.

## 2024-05-21 DIAGNOSIS — E78.2 MIXED HYPERLIPIDEMIA: ICD-10-CM

## 2024-05-21 DIAGNOSIS — D50.0 IRON DEFICIENCY ANEMIA DUE TO CHRONIC BLOOD LOSS: Primary | ICD-10-CM

## 2024-05-23 ENCOUNTER — LAB (OUTPATIENT)
Dept: LAB | Facility: HOSPITAL | Age: 74
End: 2024-05-23
Payer: MEDICARE

## 2024-05-23 DIAGNOSIS — D50.0 IRON DEFICIENCY ANEMIA DUE TO CHRONIC BLOOD LOSS: ICD-10-CM

## 2024-05-23 DIAGNOSIS — E78.2 MIXED HYPERLIPIDEMIA: ICD-10-CM

## 2024-05-23 LAB
ALBUMIN SERPL-MCNC: 4.3 G/DL (ref 3.5–5.2)
ALBUMIN/GLOB SERPL: 1.4 G/DL
ALP SERPL-CCNC: 47 U/L (ref 39–117)
ALT SERPL W P-5'-P-CCNC: 14 U/L (ref 1–33)
ANION GAP SERPL CALCULATED.3IONS-SCNC: 7.9 MMOL/L (ref 5–15)
AST SERPL-CCNC: 18 U/L (ref 1–32)
BASOPHILS # BLD AUTO: 0.03 10*3/MM3 (ref 0–0.2)
BASOPHILS NFR BLD AUTO: 0.9 % (ref 0–1.5)
BILIRUB SERPL-MCNC: 0.3 MG/DL (ref 0–1.2)
BUN SERPL-MCNC: 11 MG/DL (ref 8–23)
BUN/CREAT SERPL: 13.1 (ref 7–25)
CALCIUM SPEC-SCNC: 10.2 MG/DL (ref 8.6–10.5)
CHLORIDE SERPL-SCNC: 104 MMOL/L (ref 98–107)
CHOLEST SERPL-MCNC: 230 MG/DL (ref 0–200)
CO2 SERPL-SCNC: 27.1 MMOL/L (ref 22–29)
CREAT SERPL-MCNC: 0.84 MG/DL (ref 0.57–1)
DEPRECATED RDW RBC AUTO: 43.8 FL (ref 37–54)
EGFRCR SERPLBLD CKD-EPI 2021: 74.4 ML/MIN/1.73
EOSINOPHIL # BLD AUTO: 0.1 10*3/MM3 (ref 0–0.4)
EOSINOPHIL NFR BLD AUTO: 3 % (ref 0.3–6.2)
ERYTHROCYTE [DISTWIDTH] IN BLOOD BY AUTOMATED COUNT: 13.2 % (ref 12.3–15.4)
FERRITIN SERPL-MCNC: 63.7 NG/ML (ref 13–150)
GLOBULIN UR ELPH-MCNC: 3.1 GM/DL
GLUCOSE SERPL-MCNC: 91 MG/DL (ref 65–99)
HCT VFR BLD AUTO: 36 % (ref 34–46.6)
HDLC SERPL-MCNC: 89 MG/DL (ref 40–60)
HGB BLD-MCNC: 11.4 G/DL (ref 12–15.9)
IMM GRANULOCYTES # BLD AUTO: 0 10*3/MM3 (ref 0–0.05)
IMM GRANULOCYTES NFR BLD AUTO: 0 % (ref 0–0.5)
IRON 24H UR-MRATE: 46 MCG/DL (ref 37–145)
IRON SATN MFR SERPL: 13 % (ref 20–50)
LDLC SERPL CALC-MCNC: 130 MG/DL (ref 0–100)
LDLC/HDLC SERPL: 1.44 {RATIO}
LYMPHOCYTES # BLD AUTO: 1.32 10*3/MM3 (ref 0.7–3.1)
LYMPHOCYTES NFR BLD AUTO: 40 % (ref 19.6–45.3)
MCH RBC QN AUTO: 28.6 PG (ref 26.6–33)
MCHC RBC AUTO-ENTMCNC: 31.7 G/DL (ref 31.5–35.7)
MCV RBC AUTO: 90.5 FL (ref 79–97)
MONOCYTES # BLD AUTO: 0.26 10*3/MM3 (ref 0.1–0.9)
MONOCYTES NFR BLD AUTO: 7.9 % (ref 5–12)
NEUTROPHILS NFR BLD AUTO: 1.59 10*3/MM3 (ref 1.7–7)
NEUTROPHILS NFR BLD AUTO: 48.2 % (ref 42.7–76)
PLATELET # BLD AUTO: 248 10*3/MM3 (ref 140–450)
PMV BLD AUTO: 8.8 FL (ref 6–12)
POTASSIUM SERPL-SCNC: 4.7 MMOL/L (ref 3.5–5.2)
PROT SERPL-MCNC: 7.4 G/DL (ref 6–8.5)
RBC # BLD AUTO: 3.98 10*6/MM3 (ref 3.77–5.28)
RETICS # AUTO: 0.04 10*6/MM3 (ref 0.02–0.13)
RETICS/RBC NFR AUTO: 1.09 % (ref 0.7–1.9)
SODIUM SERPL-SCNC: 139 MMOL/L (ref 136–145)
TIBC SERPL-MCNC: 346 MCG/DL (ref 298–536)
TRANSFERRIN SERPL-MCNC: 232 MG/DL (ref 200–360)
TRIGL SERPL-MCNC: 63 MG/DL (ref 0–150)
VLDLC SERPL-MCNC: 11 MG/DL (ref 5–40)
WBC NRBC COR # BLD AUTO: 3.3 10*3/MM3 (ref 3.4–10.8)

## 2024-05-23 PROCEDURE — 80053 COMPREHEN METABOLIC PANEL: CPT

## 2024-05-23 PROCEDURE — 84466 ASSAY OF TRANSFERRIN: CPT

## 2024-05-23 PROCEDURE — 83540 ASSAY OF IRON: CPT

## 2024-05-23 PROCEDURE — 85045 AUTOMATED RETICULOCYTE COUNT: CPT

## 2024-05-23 PROCEDURE — 82728 ASSAY OF FERRITIN: CPT

## 2024-05-23 PROCEDURE — 80061 LIPID PANEL: CPT

## 2024-05-23 PROCEDURE — 85025 COMPLETE CBC W/AUTO DIFF WBC: CPT

## 2024-05-23 PROCEDURE — 36415 COLL VENOUS BLD VENIPUNCTURE: CPT

## 2024-05-24 DIAGNOSIS — D50.0 IRON DEFICIENCY ANEMIA DUE TO CHRONIC BLOOD LOSS: Primary | ICD-10-CM

## 2024-07-16 DIAGNOSIS — K22.70 BARRETT'S ESOPHAGUS WITHOUT DYSPLASIA: ICD-10-CM

## 2024-07-16 RX ORDER — PANTOPRAZOLE SODIUM 40 MG/1
40 TABLET, DELAYED RELEASE ORAL DAILY
Qty: 90 TABLET | Refills: 1 | Status: SHIPPED | OUTPATIENT
Start: 2024-07-16

## 2024-08-14 DIAGNOSIS — K22.70 BARRETT'S ESOPHAGUS WITHOUT DYSPLASIA: ICD-10-CM

## 2024-08-14 RX ORDER — FAMOTIDINE 40 MG/1
40 TABLET, FILM COATED ORAL NIGHTLY
Qty: 90 TABLET | Refills: 1 | Status: SHIPPED | OUTPATIENT
Start: 2024-08-14

## 2024-09-18 ENCOUNTER — TELEPHONE (OUTPATIENT)
Dept: FAMILY MEDICINE CLINIC | Age: 74
End: 2024-09-18
Payer: MEDICARE

## 2024-09-18 DIAGNOSIS — D50.0 IRON DEFICIENCY ANEMIA DUE TO CHRONIC BLOOD LOSS: Primary | ICD-10-CM

## 2024-09-18 DIAGNOSIS — E78.2 MIXED HYPERLIPIDEMIA: ICD-10-CM

## 2024-09-26 ENCOUNTER — LAB (OUTPATIENT)
Dept: LAB | Facility: HOSPITAL | Age: 74
End: 2024-09-26
Payer: MEDICARE

## 2024-09-26 DIAGNOSIS — E78.2 MIXED HYPERLIPIDEMIA: ICD-10-CM

## 2024-09-26 DIAGNOSIS — D50.0 IRON DEFICIENCY ANEMIA DUE TO CHRONIC BLOOD LOSS: ICD-10-CM

## 2024-09-26 LAB
ALBUMIN SERPL-MCNC: 4.4 G/DL (ref 3.5–5.2)
ALBUMIN/GLOB SERPL: 1.8 G/DL
ALP SERPL-CCNC: 48 U/L (ref 39–117)
ALT SERPL W P-5'-P-CCNC: 9 U/L (ref 1–33)
ANION GAP SERPL CALCULATED.3IONS-SCNC: 7.1 MMOL/L (ref 5–15)
AST SERPL-CCNC: 17 U/L (ref 1–32)
BASOPHILS # BLD AUTO: 0.04 10*3/MM3 (ref 0–0.2)
BASOPHILS NFR BLD AUTO: 0.8 % (ref 0–1.5)
BILIRUB SERPL-MCNC: 0.3 MG/DL (ref 0–1.2)
BUN SERPL-MCNC: 11 MG/DL (ref 8–23)
BUN/CREAT SERPL: 10.9 (ref 7–25)
CALCIUM SPEC-SCNC: 9.9 MG/DL (ref 8.6–10.5)
CHLORIDE SERPL-SCNC: 103 MMOL/L (ref 98–107)
CHOLEST SERPL-MCNC: 237 MG/DL (ref 0–200)
CO2 SERPL-SCNC: 27.9 MMOL/L (ref 22–29)
CREAT SERPL-MCNC: 1.01 MG/DL (ref 0.57–1)
DEPRECATED RDW RBC AUTO: 44.1 FL (ref 37–54)
EGFRCR SERPLBLD CKD-EPI 2021: 59.3 ML/MIN/1.73
EOSINOPHIL # BLD AUTO: 0.12 10*3/MM3 (ref 0–0.4)
EOSINOPHIL NFR BLD AUTO: 2.3 % (ref 0.3–6.2)
ERYTHROCYTE [DISTWIDTH] IN BLOOD BY AUTOMATED COUNT: 13.4 % (ref 12.3–15.4)
FERRITIN SERPL-MCNC: 55.5 NG/ML (ref 13–150)
GLOBULIN UR ELPH-MCNC: 2.5 GM/DL
GLUCOSE SERPL-MCNC: 82 MG/DL (ref 65–99)
HCT VFR BLD AUTO: 34.3 % (ref 34–46.6)
HDLC SERPL-MCNC: 76 MG/DL (ref 40–60)
HGB BLD-MCNC: 11.2 G/DL (ref 12–15.9)
IMM GRANULOCYTES # BLD AUTO: 0.01 10*3/MM3 (ref 0–0.05)
IMM GRANULOCYTES NFR BLD AUTO: 0.2 % (ref 0–0.5)
IRON 24H UR-MRATE: 49 MCG/DL (ref 37–145)
IRON SATN MFR SERPL: 14 % (ref 20–50)
LDLC SERPL CALC-MCNC: 149 MG/DL (ref 0–100)
LDLC/HDLC SERPL: 1.93 {RATIO}
LYMPHOCYTES # BLD AUTO: 1.46 10*3/MM3 (ref 0.7–3.1)
LYMPHOCYTES NFR BLD AUTO: 27.7 % (ref 19.6–45.3)
MCH RBC QN AUTO: 29 PG (ref 26.6–33)
MCHC RBC AUTO-ENTMCNC: 32.7 G/DL (ref 31.5–35.7)
MCV RBC AUTO: 88.9 FL (ref 79–97)
MONOCYTES # BLD AUTO: 0.38 10*3/MM3 (ref 0.1–0.9)
MONOCYTES NFR BLD AUTO: 7.2 % (ref 5–12)
NEUTROPHILS NFR BLD AUTO: 3.26 10*3/MM3 (ref 1.7–7)
NEUTROPHILS NFR BLD AUTO: 61.8 % (ref 42.7–76)
PLATELET # BLD AUTO: 247 10*3/MM3 (ref 140–450)
PMV BLD AUTO: 8.6 FL (ref 6–12)
POTASSIUM SERPL-SCNC: 4.4 MMOL/L (ref 3.5–5.2)
PROT SERPL-MCNC: 6.9 G/DL (ref 6–8.5)
RBC # BLD AUTO: 3.86 10*6/MM3 (ref 3.77–5.28)
RETICS # AUTO: 0.04 10*6/MM3 (ref 0.02–0.13)
RETICS/RBC NFR AUTO: 1.01 % (ref 0.7–1.9)
SODIUM SERPL-SCNC: 138 MMOL/L (ref 136–145)
TIBC SERPL-MCNC: 341 MCG/DL (ref 298–536)
TRANSFERRIN SERPL-MCNC: 229 MG/DL (ref 200–360)
TRIGL SERPL-MCNC: 72 MG/DL (ref 0–150)
VLDLC SERPL-MCNC: 12 MG/DL (ref 5–40)
WBC NRBC COR # BLD AUTO: 5.27 10*3/MM3 (ref 3.4–10.8)

## 2024-09-26 PROCEDURE — 83540 ASSAY OF IRON: CPT

## 2024-09-26 PROCEDURE — 84466 ASSAY OF TRANSFERRIN: CPT

## 2024-09-26 PROCEDURE — 85045 AUTOMATED RETICULOCYTE COUNT: CPT

## 2024-09-26 PROCEDURE — 82728 ASSAY OF FERRITIN: CPT

## 2024-09-26 PROCEDURE — 85025 COMPLETE CBC W/AUTO DIFF WBC: CPT

## 2024-09-26 PROCEDURE — 80061 LIPID PANEL: CPT

## 2024-09-26 PROCEDURE — 36415 COLL VENOUS BLD VENIPUNCTURE: CPT

## 2024-09-26 PROCEDURE — 80053 COMPREHEN METABOLIC PANEL: CPT

## 2024-09-27 DIAGNOSIS — D50.0 IRON DEFICIENCY ANEMIA DUE TO CHRONIC BLOOD LOSS: Primary | ICD-10-CM

## 2025-01-13 DIAGNOSIS — K22.70 BARRETT'S ESOPHAGUS WITHOUT DYSPLASIA: ICD-10-CM

## 2025-01-14 RX ORDER — PANTOPRAZOLE SODIUM 40 MG/1
40 TABLET, DELAYED RELEASE ORAL DAILY
Qty: 90 TABLET | Refills: 1 | Status: SHIPPED | OUTPATIENT
Start: 2025-01-14

## 2025-02-11 DIAGNOSIS — K22.70 BARRETT'S ESOPHAGUS WITHOUT DYSPLASIA: ICD-10-CM

## 2025-02-12 RX ORDER — FAMOTIDINE 40 MG/1
40 TABLET, FILM COATED ORAL NIGHTLY
Qty: 90 TABLET | Refills: 1 | Status: SHIPPED | OUTPATIENT
Start: 2025-02-12

## 2025-03-03 ENCOUNTER — TELEPHONE (OUTPATIENT)
Dept: FAMILY MEDICINE CLINIC | Age: 75
End: 2025-03-03

## 2025-03-03 ENCOUNTER — OFFICE VISIT (OUTPATIENT)
Dept: FAMILY MEDICINE CLINIC | Age: 75
End: 2025-03-03
Payer: MEDICARE

## 2025-03-03 VITALS
WEIGHT: 156.4 LBS | SYSTOLIC BLOOD PRESSURE: 138 MMHG | HEART RATE: 77 BPM | BODY MASS INDEX: 23.7 KG/M2 | TEMPERATURE: 97.7 F | OXYGEN SATURATION: 100 % | HEIGHT: 68 IN | DIASTOLIC BLOOD PRESSURE: 87 MMHG

## 2025-03-03 DIAGNOSIS — D12.2 ADENOMATOUS POLYP OF ASCENDING COLON: ICD-10-CM

## 2025-03-03 DIAGNOSIS — K22.70 BARRETT'S ESOPHAGUS WITHOUT DYSPLASIA: ICD-10-CM

## 2025-03-03 DIAGNOSIS — Z12.31 ENCOUNTER FOR SCREENING MAMMOGRAM FOR MALIGNANT NEOPLASM OF BREAST: ICD-10-CM

## 2025-03-03 DIAGNOSIS — Z00.00 MEDICARE ANNUAL WELLNESS VISIT, SUBSEQUENT: Primary | ICD-10-CM

## 2025-03-03 DIAGNOSIS — E78.2 MIXED HYPERLIPIDEMIA: ICD-10-CM

## 2025-03-03 PROCEDURE — 1126F AMNT PAIN NOTED NONE PRSNT: CPT | Performed by: FAMILY MEDICINE

## 2025-03-03 PROCEDURE — G0439 PPPS, SUBSEQ VISIT: HCPCS | Performed by: FAMILY MEDICINE

## 2025-03-03 PROCEDURE — G2211 COMPLEX E/M VISIT ADD ON: HCPCS | Performed by: FAMILY MEDICINE

## 2025-03-03 PROCEDURE — 1170F FXNL STATUS ASSESSED: CPT | Performed by: FAMILY MEDICINE

## 2025-03-03 PROCEDURE — 99213 OFFICE O/P EST LOW 20 MIN: CPT | Performed by: FAMILY MEDICINE

## 2025-03-03 NOTE — ASSESSMENT & PLAN NOTE
Reviewed her lipid profile and calculated cardiac risk index.  She is not interested in statin medication at this time.  Certainly with her level of HDL doubt she has much to worry about.  Did not see need for follow-up labs today

## 2025-03-03 NOTE — ASSESSMENT & PLAN NOTE
Will schedule screening mammogram.  She declines a breast exam.  Orders:    Mammo Screening Digital Tomosynthesis Bilateral With CAD; Future

## 2025-03-03 NOTE — ASSESSMENT & PLAN NOTE
We reviewed the preventive service recommendations and created an individualized handout.  Did tell her that it is a good time to take flu shot should she be inclined to do so.  She is up-to-date on Tdap

## 2025-03-03 NOTE — ASSESSMENT & PLAN NOTE
The copy of the colonoscopy report we received from Dr. Montes office did not have findings nor did it recommend follow-up.  She says she was told to follow-up in 7 years.  She does not feel comfortable with this as she had traditionally been getting scopes every 3 years.  Will send to his office for clarification on results and recommendations for repeat

## 2025-03-03 NOTE — TELEPHONE ENCOUNTER
Dr. Ag Montes performed patient's colonoscopy May 2024  Colonoscopy, Scan (05/20/2024)   Copy of our report left page 3 blank does not contain results or recommendations for follow-up.    Please contact his office and ask for an update/correction    mas

## 2025-03-03 NOTE — PROGRESS NOTES
Subjective   The ABCs of the Annual Wellness Visit  Medicare Wellness Visit      Hortencia Parisi is a 74 y.o. patient who presents for a Medicare Wellness Visit.    The following portions of the patient's history were reviewed and   updated as appropriate: allergies, current medications, past family history, past medical history, past social history, past surgical history, and problem list.    Compared to one year ago, the patient's physical   health is the same.  Compared to one year ago, the patient's mental   health is the same.    Recent Hospitalizations:  She was not admitted to the hospital during the last year.     Current Medical Providers:  Patient Care Team:  Jarrett Marrero MD as PCP - General (Family Medicine)    Outpatient Medications Prior to Visit   Medication Sig Dispense Refill    Calcium Carbonate-Vit D-Min (CALCIUM 1200 PO) Take  by mouth Daily.      dimenhyDRINATE (DRAMAMINE PO) Take  by mouth As Needed (vertigo).      famotidine (Pepcid) 40 MG tablet Take 1 tablet by mouth Every Night. 90 tablet 1    ferrous sulfate (FerrouSul) 325 (65 FE) MG tablet Take 1 tablet by mouth 3 (Three) Times a Week. 3 times a week 1 tablet 0    fluticasone (FLONASE) 50 MCG/ACT nasal spray Administer 2 sprays into the nostril(s) as directed by provider Daily.      glucosamine-chondroitin 500-400 MG capsule capsule Take 1 capsule by mouth Daily.      pantoprazole (PROTONIX) 40 MG EC tablet Take 1 tablet by mouth Daily. 90 tablet 1     No facility-administered medications prior to visit.     No opioid medication identified on active medication list. I have reviewed chart for other potential  high risk medication/s and harmful drug interactions in the elderly.      Aspirin is not on active medication list.  Aspirin use is not indicated based on review of current medical condition/s. Risk of harm outweighs potential benefits.  .    Patient Active Problem List   Diagnosis    Villarreal's esophagus without dysplasia  "   Colon polyp    Iron deficiency anemia due to chronic blood loss    Shortness of breath    Encounter for screening mammogram for malignant neoplasm of breast    Vaccination hesitancy by patient    Medicare annual wellness visit, subsequent    Headache    Mixed hyperlipidemia     Advance Care Planning Advance Directive is not on file.  ACP discussion was held with the patient during this visit. Patient does not have an advance directive, information provided.            Objective   Vitals:    03/03/25 1030   BP: 138/87   BP Location: Left arm   Patient Position: Sitting   Pulse: 77   Temp: 97.7 °F (36.5 °C)   TempSrc: Temporal   SpO2: 100%   Weight: 70.9 kg (156 lb 6.4 oz)   Height: 172.7 cm (67.99\")   PainSc: 0-No pain       Estimated body mass index is 23.79 kg/m² as calculated from the following:    Height as of this encounter: 172.7 cm (67.99\").    Weight as of this encounter: 70.9 kg (156 lb 6.4 oz).    BMI is within normal parameters. No other follow-up for BMI required.           Does the patient have evidence of cognitive impairment? No                                                                                                Health  Risk Assessment    Smoking Status:  Social History     Tobacco Use   Smoking Status Never   Smokeless Tobacco Never     Alcohol Consumption:  Social History     Substance and Sexual Activity   Alcohol Use Never       Fall Risk Screen  STEADI Fall Risk Assessment was completed, and patient is at LOW risk for falls.Assessment completed on:3/3/2025    Depression Screening   Little interest or pleasure in doing things? Not at all   Feeling down, depressed, or hopeless? Not at all   PHQ-2 Total Score 0      Health Habits and Functional and Cognitive Screening:      3/3/2025    10:36 AM   Functional & Cognitive Status   Do you have difficulty preparing food and eating? No   Do you have difficulty bathing yourself, getting dressed or grooming yourself? No   Do you have difficulty " using the toilet? No   Do you have difficulty moving around from place to place? No   Do you have trouble with steps or getting out of a bed or a chair? No   Current Diet Well Balanced Diet   Dental Exam Up to date   Eye Exam Not up to date   Exercise (times per week) 0 times per week   Current Exercises Include No Regular Exercise   Do you need help using the phone?  No   Are you deaf or do you have serious difficulty hearing?  No   Do you need help to go to places out of walking distance? No   Do you need help shopping? No   Do you need help preparing meals?  No   Do you need help with housework?  No   Do you need help with laundry? No   Do you need help taking your medications? No   Do you need help managing money? No   Do you ever drive or ride in a car without wearing a seat belt? No   Have you felt unusual stress, anger or loneliness in the last month? No   Who do you live with? Spouse   If you need help, do you have trouble finding someone available to you? No   Have you been bothered in the last four weeks by sexual problems? No   Do you have difficulty concentrating, remembering or making decisions? No           Age-appropriate Screening Schedule:  Refer to the list below for future screening recommendations based on patient's age, sex and/or medical conditions. Orders for these recommended tests are listed in the plan section. The patient has been provided with a written plan.    Health Maintenance List  Health Maintenance   Topic Date Due    INFLUENZA VACCINE  Never done    COVID-19 Vaccine (1 - 2024-25 season) Never done    MAMMOGRAM  03/24/2025    LIPID PANEL  09/26/2025    ANNUAL WELLNESS VISIT  03/03/2026    DXA SCAN  03/12/2026    COLORECTAL CANCER SCREENING  05/20/2027    TDAP/TD VACCINES (2 - Td or Tdap) 11/03/2027    HEPATITIS C SCREENING  Completed    Pneumococcal Vaccine 50+  Completed    ZOSTER VACCINE  Completed                                                                                     "                                                            CMS Preventative Services Quick Reference  Risk Factors Identified During Encounter  None Identified    The above risks/problems have been discussed with the patient.  Pertinent information has been shared with the patient in the After Visit Summary.  An After Visit Summary and PPPS were made available to the patient.    Follow Up:   Next Medicare Wellness visit to be scheduled in 1 year.         Additional E&M Note during same encounter follows:  Patient has additional, significant, and separately identifiable condition(s)/problem(s) that require work above and beyond the Medicare Wellness Visit     Chief Complaint  Medicare Wellness-subsequent    Subjective   HPI  Hortencia is also being seen today for additional medical problem/s.        Does have diagnosis of Villarreal's esophagus.  Her last EGD was April 2023.  On a regimen of Protonix and Pepcid.  It was recommended to repeat the EGD in 3 years (next year).    Had colonoscopy performed in May 2024    Was seen by Dr. Sammy Bird, [facility] ([facility]), evaluation of iron deficient anemia in the setting of Villarreal's esophagus.  She has oral iron intolerance.  Next follow-up visit with [facility] is March 11.    Typically walks 4 days a week when the weather is good.  Has not been doing that lately as were just now coming out of winter.    The 10-year ASCVD risk score (Bakari BASHIR, et al., 2019) is: 13.5%    Values used to calculate the score:      Age: 74 years      Sex: Female      Is Non- : No      Diabetic: No      Tobacco smoker: No      Systolic Blood Pressure: 138 mmHg      Is BP treated: No      HDL Cholesterol: 76 mg/dL      Total Cholesterol: 237 mg/dL          Objective   Vital Signs:  /87 (BP Location: Left arm, Patient Position: Sitting)   Pulse 77   Temp 97.7 °F (36.5 °C) (Temporal)   Ht 172.7 cm (67.99\")   Wt 70.9 kg (156 lb 6.4 oz)   SpO2 100%   BMI 23.79 " kg/m²   Physical Exam  Vitals and nursing note reviewed.   Constitutional:       General: She is not in acute distress.     Appearance: Normal appearance.   HENT:      Right Ear: Tympanic membrane and ear canal normal.      Left Ear: Tympanic membrane and ear canal normal.      Mouth/Throat:      Mouth: Mucous membranes are moist.      Pharynx: Oropharynx is clear. No oropharyngeal exudate.   Eyes:      General: No scleral icterus.     Conjunctiva/sclera: Conjunctivae normal.   Neck:      Vascular: No carotid bruit.   Cardiovascular:      Rate and Rhythm: Normal rate and regular rhythm.      Heart sounds: No murmur heard.     No friction rub. No gallop.   Pulmonary:      Effort: No respiratory distress.      Breath sounds: No wheezing or rales.   Abdominal:      General: Bowel sounds are normal.      Palpations: Abdomen is soft. There is no mass.      Tenderness: There is no abdominal tenderness. There is no guarding or rebound.   Musculoskeletal:         General: No swelling.      Right lower leg: No edema.      Left lower leg: No edema.   Lymphadenopathy:      Cervical: No cervical adenopathy.   Skin:     Coloration: Skin is not jaundiced.      Findings: No lesion.   Neurological:      General: No focal deficit present.      Mental Status: She is alert and oriented to person, place, and time.   Psychiatric:         Mood and Affect: Mood normal.         Behavior: Behavior normal.         Thought Content: Thought content normal.         Judgment: Judgment normal.                             Assessment and Plan            Medicare annual wellness visit, subsequent  We reviewed the preventive service recommendations and created an individualized handout.  Did tell her that it is a good time to take flu shot should she be inclined to do so.  She is up-to-date on Tdap       Villarreal's esophagus without dysplasia  Last EGD 2023 due for repeat next year.  Continue on the pantoprazole.       Encounter for screening  mammogram for malignant neoplasm of breast  Will schedule screening mammogram.  She declines a breast exam.  Orders:    Mammo Screening Digital Tomosynthesis Bilateral With CAD; Future    Mixed hyperlipidemia  Reviewed her lipid profile and calculated cardiac risk index.  She is not interested in statin medication at this time.  Certainly with her level of HDL doubt she has much to worry about.  Did not see need for follow-up labs today         Adenomatous polyp of ascending colon  The copy of the colonoscopy report we received from Dr. Montes office did not have findings nor did it recommend follow-up.  She says she was told to follow-up in 7 years.  She does not feel comfortable with this as she had traditionally been getting scopes every 3 years.  Will send to his office for clarification on results and recommendations for repeat               Follow Up   No follow-ups on file.  Patient was given instructions and counseling regarding her condition or for health maintenance advice. Please see specific information pulled into the AVS if appropriate.

## 2025-05-01 ENCOUNTER — RESULTS FOLLOW-UP (OUTPATIENT)
Dept: FAMILY MEDICINE CLINIC | Age: 75
End: 2025-05-01
Payer: MEDICARE

## 2025-05-01 ENCOUNTER — HOSPITAL ENCOUNTER (OUTPATIENT)
Dept: MAMMOGRAPHY | Facility: HOSPITAL | Age: 75
Discharge: HOME | End: 2025-05-01
Admitting: FAMILY MEDICINE
Payer: MEDICARE

## 2025-05-01 DIAGNOSIS — Z12.31 ENCOUNTER FOR SCREENING MAMMOGRAM FOR MALIGNANT NEOPLASM OF BREAST: ICD-10-CM

## 2025-05-01 PROCEDURE — 77063 BREAST TOMOSYNTHESIS BI: CPT

## 2025-05-01 PROCEDURE — 77067 SCR MAMMO BI INCL CAD: CPT

## 2025-05-20 ENCOUNTER — OFFICE VISIT (OUTPATIENT)
Dept: FAMILY MEDICINE CLINIC | Age: 75
End: 2025-05-20
Payer: MEDICARE

## 2025-05-20 ENCOUNTER — HOSPITAL ENCOUNTER (OUTPATIENT)
Dept: GENERAL RADIOLOGY | Facility: HOSPITAL | Age: 75
Discharge: HOME | End: 2025-05-20
Admitting: NURSE PRACTITIONER
Payer: MEDICARE

## 2025-05-20 ENCOUNTER — RESULTS FOLLOW-UP (OUTPATIENT)
Dept: FAMILY MEDICINE CLINIC | Age: 75
End: 2025-05-20

## 2025-05-20 VITALS
OXYGEN SATURATION: 97 % | HEIGHT: 68 IN | WEIGHT: 157.4 LBS | DIASTOLIC BLOOD PRESSURE: 78 MMHG | BODY MASS INDEX: 23.86 KG/M2 | TEMPERATURE: 98 F | SYSTOLIC BLOOD PRESSURE: 128 MMHG | HEART RATE: 87 BPM

## 2025-05-20 DIAGNOSIS — L08.9 SKIN INFECTION: ICD-10-CM

## 2025-05-20 DIAGNOSIS — M79.671 RIGHT FOOT PAIN: ICD-10-CM

## 2025-05-20 DIAGNOSIS — M79.671 RIGHT FOOT PAIN: Primary | ICD-10-CM

## 2025-05-20 PROCEDURE — 73630 X-RAY EXAM OF FOOT: CPT

## 2025-05-20 PROCEDURE — 99214 OFFICE O/P EST MOD 30 MIN: CPT | Performed by: NURSE PRACTITIONER

## 2025-05-20 PROCEDURE — 1160F RVW MEDS BY RX/DR IN RCRD: CPT | Performed by: NURSE PRACTITIONER

## 2025-05-20 PROCEDURE — 1159F MED LIST DOCD IN RCRD: CPT | Performed by: NURSE PRACTITIONER

## 2025-05-20 PROCEDURE — 1125F AMNT PAIN NOTED PAIN PRSNT: CPT | Performed by: NURSE PRACTITIONER

## 2025-05-20 RX ORDER — DOXYCYCLINE HYCLATE 100 MG
100 TABLET ORAL 2 TIMES DAILY
Qty: 20 TABLET | Refills: 0 | Status: SHIPPED | OUTPATIENT
Start: 2025-05-20 | End: 2025-05-30

## 2025-05-20 NOTE — PROGRESS NOTES
Chief Complaint  Hortencia Parisi presents to [facility] for Foot Injury (Something stuck, right foot)    Subjective          History of Present Illness    Hortencia is here today with c/o right foot sore. Reports that she hit the bottom of her right foot on something her deck 2 nights ago. Not sure if wood or nail. She is worried that she has an infection and that something may be stuck in her foot. She has tried to soak in Epsom salts. Notes some soreness. Has been been elevating. UTD Tdap vaccine    Review of Systems      Allergies   Allergen Reactions    Prednisone Nausea Only and Dizziness      Past Medical History:   Diagnosis Date    Allergic 2/18    Prednisone    Anemia 2/18    Colon polyp 2/18    Diverticulosis 2/18    GERD (gastroesophageal reflux disease) 2/20    Diagnosed with Barretts    Osteopenia 2/20    Osteopenia    Pneumonia      Current Outpatient Medications   Medication Sig Dispense Refill    Calcium Carbonate-Vit D-Min (CALCIUM 1200 PO) Take  by mouth Daily.      dimenhyDRINATE (DRAMAMINE PO) Take  by mouth As Needed (vertigo).      famotidine (Pepcid) 40 MG tablet Take 1 tablet by mouth Every Night. 90 tablet 1    ferrous sulfate (FerrouSul) 325 (65 FE) MG tablet Take 1 tablet by mouth 3 (Three) Times a Week. 3 times a week 1 tablet 0    fluticasone (FLONASE) 50 MCG/ACT nasal spray Administer 2 sprays into the nostril(s) as directed by provider Daily.      glucosamine-chondroitin 500-400 MG capsule capsule Take 1 capsule by mouth Daily.      pantoprazole (PROTONIX) 40 MG EC tablet Take 1 tablet by mouth Daily. 90 tablet 1    doxycycline (VIBRAMYICN) 100 MG tablet Take 1 tablet by mouth 2 (Two) Times a Day for 10 days. No dairy products within 2 hours of a dose 20 tablet 0     No current facility-administered medications for this visit.     Past Surgical History:   Procedure Laterality Date    CHOLECYSTECTOMY  1999    COLONOSCOPY  3/21    TUBAL ABDOMINAL  "LIGATION  1981      Social History     Tobacco Use    Smoking status: Never    Smokeless tobacco: Never   Vaping Use    Vaping status: Never Used   Substance Use Topics    Alcohol use: Never    Drug use: Never     Family History   Problem Relation Age of Onset    Aortic aneurysm Father     Atrial fibrillation Father     Kidney cancer Father     COPD Father     Diverticulosis Sister         rupture Bowel     There are no preventive care reminders to display for this patient.   Immunization History   Administered Date(s) Administered    Pneumococcal Conjugate 13-Valent (PCV13) 12/05/2016    Pneumococcal Polysaccharide (PPSV23) 12/22/2017    Shingrix 03/24/2023, 07/13/2023    Tdap 11/03/2017        Objective     Vitals:    05/20/25 1410   BP: 128/78   Pulse: 87   Temp: 98 °F (36.7 °C)   TempSrc: Oral   SpO2: 97%   Weight: 71.4 kg (157 lb 6.4 oz)   Height: 172.7 cm (67.99\")     Body mass index is 23.94 kg/m².   BMI is within normal parameters. No other follow-up for BMI required.            No results found.    Physical Exam  Vitals reviewed.   Constitutional:       General: She is not in acute distress.     Appearance: Normal appearance. She is well-developed.   HENT:      Head: Normocephalic and atraumatic.   Cardiovascular:      Rate and Rhythm: Normal rate.   Pulmonary:      Effort: Pulmonary effort is normal.   Skin:     Comments: Right foot with area of cut with surrounding erythema, tender to touch   Neurological:      Mental Status: She is alert and oriented to person, place, and time.   Psychiatric:         Mood and Affect: Mood and affect normal.           Result Review :     The following data was reviewed by: JUAN FRANCISCO Tsai on 05/20/2025:    XR Foot 3+ View Right (05/20/2025 14:37)                       Assessment and Plan      Assessment & Plan  Right foot pain    Orders:    XR Foot 3+ View Right; Future    Skin infection    Orders:    doxycycline (VIBRAMYICN) 100 MG tablet; Take 1 tablet by mouth 2 " (Two) Times a Day for 10 days. No dairy products within 2 hours of a dose      Right foot xray without foreign body or osteomyelitis. Will treat with oral antibiotic course.         Follow Up     Return for As needed for persistent or worsening symptoms.

## 2025-06-09 ENCOUNTER — TELEPHONE (OUTPATIENT)
Dept: FAMILY MEDICINE CLINIC | Age: 75
End: 2025-06-09
Payer: MEDICARE

## 2025-06-09 NOTE — TELEPHONE ENCOUNTER
Please follow up with Pt.  Pt called and said she seen NP on 5/20 and had xray . The xray showed nothing in foot . A couple of days later a huge splinter came out .

## 2025-07-09 DIAGNOSIS — K22.70 BARRETT'S ESOPHAGUS WITHOUT DYSPLASIA: ICD-10-CM

## 2025-07-09 RX ORDER — PANTOPRAZOLE SODIUM 40 MG/1
40 TABLET, DELAYED RELEASE ORAL DAILY
Qty: 90 TABLET | Refills: 1 | Status: SHIPPED | OUTPATIENT
Start: 2025-07-09

## 2025-08-10 DIAGNOSIS — K22.70 BARRETT'S ESOPHAGUS WITHOUT DYSPLASIA: ICD-10-CM

## 2025-08-11 RX ORDER — FAMOTIDINE 40 MG/1
40 TABLET, FILM COATED ORAL NIGHTLY
Qty: 90 TABLET | Refills: 1 | Status: SHIPPED | OUTPATIENT
Start: 2025-08-11